# Patient Record
Sex: MALE | Race: WHITE | NOT HISPANIC OR LATINO | Employment: FULL TIME | URBAN - METROPOLITAN AREA
[De-identification: names, ages, dates, MRNs, and addresses within clinical notes are randomized per-mention and may not be internally consistent; named-entity substitution may affect disease eponyms.]

---

## 2020-03-24 ENCOUNTER — TELEPHONE (OUTPATIENT)
Dept: ENDOCRINOLOGY | Age: 45
End: 2020-03-24

## 2020-03-24 NOTE — TELEPHONE ENCOUNTER
RECORDS FOR NEW PT ENDO REFERRAL HAVE BEEN GIVEN TO DR MURRAY FOR REVIEW. WAITING FOR APPROVAL FROM DR MURRAY TO SCHEDULE. PT REFERRED BY DR LALI NARVAEZ FOR DIABETES. NEW PT RECORDS HAVE BEEN SCANNED INTO PT'S CHART.

## 2020-03-26 ENCOUNTER — TELEPHONE (OUTPATIENT)
Dept: ENDOCRINOLOGY | Age: 45
End: 2020-03-26

## 2020-03-26 NOTE — TELEPHONE ENCOUNTER
FIRST ATTEMPT TO REACH PT TO SCHEDULE NEW PT ENDO APPT W/DR MURRAY. LEFT MSG. PER DR MURRAY LEVEL 2 OK TO SCHEDULE.

## 2020-03-27 ENCOUNTER — TELEPHONE (OUTPATIENT)
Dept: ENDOCRINOLOGY | Age: 45
End: 2020-03-27

## 2020-03-30 ENCOUNTER — TELEPHONE (OUTPATIENT)
Dept: ENDOCRINOLOGY | Age: 45
End: 2020-03-30

## 2020-03-30 NOTE — TELEPHONE ENCOUNTER
THIRD ATTEMPT TO REACH PT TO SCHEDULE NEW PT ENDO APPT W/DR MURRAY. LEFT MSG. UNABLE TO REACH PT CONFIRMATION FAXED TO DR NARVAEZ [phone].

## 2020-08-23 ENCOUNTER — APPOINTMENT (OUTPATIENT)
Dept: GENERAL RADIOLOGY | Facility: HOSPITAL | Age: 45
End: 2020-08-23

## 2020-08-23 ENCOUNTER — HOSPITAL ENCOUNTER (INPATIENT)
Facility: HOSPITAL | Age: 45
LOS: 3 days | Discharge: HOME | End: 2020-08-26
Attending: EMERGENCY MEDICINE | Admitting: INTERNAL MEDICINE

## 2020-08-23 DIAGNOSIS — E87.1 HYPONATREMIA: ICD-10-CM

## 2020-08-23 DIAGNOSIS — N28.9 RENAL INSUFFICIENCY: ICD-10-CM

## 2020-08-23 DIAGNOSIS — A41.9 SEPSIS, DUE TO UNSPECIFIED ORGANISM, UNSPECIFIED WHETHER ACUTE ORGAN DYSFUNCTION PRESENT (HCC): ICD-10-CM

## 2020-08-23 DIAGNOSIS — L03.032 CELLULITIS OF GREAT TOE OF LEFT FOOT: Primary | ICD-10-CM

## 2020-08-23 DIAGNOSIS — E87.6 HYPOKALEMIA: ICD-10-CM

## 2020-08-23 LAB
ALBUMIN SERPL-MCNC: 4.1 G/DL (ref 3.5–5.2)
ALBUMIN/GLOB SERPL: 1.2 G/DL
ALP SERPL-CCNC: 65 U/L (ref 39–117)
ALT SERPL W P-5'-P-CCNC: 10 U/L (ref 1–41)
ANION GAP SERPL CALCULATED.3IONS-SCNC: 11.7 MMOL/L (ref 5–15)
AST SERPL-CCNC: 10 U/L (ref 1–40)
B PARAPERT DNA SPEC QL NAA+PROBE: NOT DETECTED
B PERT DNA SPEC QL NAA+PROBE: NOT DETECTED
BASOPHILS # BLD AUTO: 0.06 10*3/MM3 (ref 0–0.2)
BASOPHILS NFR BLD AUTO: 0.8 % (ref 0–1.5)
BILIRUB SERPL-MCNC: 0.3 MG/DL (ref 0–1.2)
BUN SERPL-MCNC: 18 MG/DL (ref 6–20)
BUN/CREAT SERPL: 11.5 (ref 7–25)
C PNEUM DNA NPH QL NAA+NON-PROBE: NOT DETECTED
CALCIUM SPEC-SCNC: 9.7 MG/DL (ref 8.6–10.5)
CHLORIDE SERPL-SCNC: 91 MMOL/L (ref 98–107)
CO2 SERPL-SCNC: 26.3 MMOL/L (ref 22–29)
CREAT SERPL-MCNC: 1.56 MG/DL (ref 0.76–1.27)
CRP SERPL-MCNC: 1.08 MG/DL (ref 0–0.5)
D-LACTATE SERPL-SCNC: 3.1 MMOL/L (ref 0.5–2)
DEPRECATED RDW RBC AUTO: 45.5 FL (ref 37–54)
EOSINOPHIL # BLD AUTO: 0.3 10*3/MM3 (ref 0–0.4)
EOSINOPHIL NFR BLD AUTO: 3.9 % (ref 0.3–6.2)
ERYTHROCYTE [DISTWIDTH] IN BLOOD BY AUTOMATED COUNT: 15 % (ref 12.3–15.4)
ERYTHROCYTE [SEDIMENTATION RATE] IN BLOOD: 58 MM/HR (ref 0–15)
FLUAV H1 2009 PAND RNA NPH QL NAA+PROBE: NOT DETECTED
FLUAV H1 HA GENE NPH QL NAA+PROBE: NOT DETECTED
FLUAV H3 RNA NPH QL NAA+PROBE: NOT DETECTED
FLUAV SUBTYP SPEC NAA+PROBE: NOT DETECTED
FLUBV RNA ISLT QL NAA+PROBE: NOT DETECTED
GFR SERPL CREATININE-BSD FRML MDRD: 48 ML/MIN/1.73
GLOBULIN UR ELPH-MCNC: 3.3 GM/DL
GLUCOSE SERPL-MCNC: 204 MG/DL (ref 65–99)
HADV DNA SPEC NAA+PROBE: NOT DETECTED
HCOV 229E RNA SPEC QL NAA+PROBE: NOT DETECTED
HCOV HKU1 RNA SPEC QL NAA+PROBE: NOT DETECTED
HCOV NL63 RNA SPEC QL NAA+PROBE: NOT DETECTED
HCOV OC43 RNA SPEC QL NAA+PROBE: NOT DETECTED
HCT VFR BLD AUTO: 40.6 % (ref 37.5–51)
HGB BLD-MCNC: 12.9 G/DL (ref 13–17.7)
HMPV RNA NPH QL NAA+NON-PROBE: NOT DETECTED
HPIV1 RNA SPEC QL NAA+PROBE: NOT DETECTED
HPIV2 RNA SPEC QL NAA+PROBE: NOT DETECTED
HPIV3 RNA NPH QL NAA+PROBE: NOT DETECTED
HPIV4 P GENE NPH QL NAA+PROBE: NOT DETECTED
IMM GRANULOCYTES # BLD AUTO: 0.05 10*3/MM3 (ref 0–0.05)
IMM GRANULOCYTES NFR BLD AUTO: 0.6 % (ref 0–0.5)
INR PPP: 1.03 (ref 0.9–1.1)
LYMPHOCYTES # BLD AUTO: 1.3 10*3/MM3 (ref 0.7–3.1)
LYMPHOCYTES NFR BLD AUTO: 16.8 % (ref 19.6–45.3)
M PNEUMO IGG SER IA-ACNC: NOT DETECTED
MCH RBC QN AUTO: 26.7 PG (ref 26.6–33)
MCHC RBC AUTO-ENTMCNC: 31.8 G/DL (ref 31.5–35.7)
MCV RBC AUTO: 84.1 FL (ref 79–97)
MONOCYTES # BLD AUTO: 0.62 10*3/MM3 (ref 0.1–0.9)
MONOCYTES NFR BLD AUTO: 8 % (ref 5–12)
NEUTROPHILS NFR BLD AUTO: 5.43 10*3/MM3 (ref 1.7–7)
NEUTROPHILS NFR BLD AUTO: 69.9 % (ref 42.7–76)
NRBC BLD AUTO-RTO: 0 /100 WBC (ref 0–0.2)
PLATELET # BLD AUTO: 192 10*3/MM3 (ref 140–450)
PMV BLD AUTO: 9.9 FL (ref 6–12)
POTASSIUM SERPL-SCNC: 3 MMOL/L (ref 3.5–5.2)
PROCALCITONIN SERPL-MCNC: 0.1 NG/ML (ref 0–0.25)
PROT SERPL-MCNC: 7.4 G/DL (ref 6–8.5)
PROTHROMBIN TIME: 13.4 SECONDS (ref 11.7–14.2)
RBC # BLD AUTO: 4.83 10*6/MM3 (ref 4.14–5.8)
RESP PATH DNA+RNA PNL NPH NAA+NON-PROBE: NOT DETECTED
RHINOVIRUS RNA SPEC NAA+PROBE: NOT DETECTED
RSV RNA NPH QL NAA+NON-PROBE: NOT DETECTED
SODIUM SERPL-SCNC: 129 MMOL/L (ref 136–145)
WBC # BLD AUTO: 7.76 10*3/MM3 (ref 3.4–10.8)

## 2020-08-23 PROCEDURE — 25010000002 VANCOMYCIN 10 G RECONSTITUTED SOLUTION: Performed by: EMERGENCY MEDICINE

## 2020-08-23 PROCEDURE — 0202U NFCT DS 22 TRGT SARS-COV-2: CPT | Performed by: EMERGENCY MEDICINE

## 2020-08-23 PROCEDURE — 71045 X-RAY EXAM CHEST 1 VIEW: CPT

## 2020-08-23 PROCEDURE — 25010000002 PIPERACILLIN SOD-TAZOBACTAM PER 1 G: Performed by: EMERGENCY MEDICINE

## 2020-08-23 PROCEDURE — 99284 EMERGENCY DEPT VISIT MOD MDM: CPT

## 2020-08-23 PROCEDURE — 36415 COLL VENOUS BLD VENIPUNCTURE: CPT

## 2020-08-23 PROCEDURE — 86140 C-REACTIVE PROTEIN: CPT | Performed by: EMERGENCY MEDICINE

## 2020-08-23 PROCEDURE — 87040 BLOOD CULTURE FOR BACTERIA: CPT | Performed by: EMERGENCY MEDICINE

## 2020-08-23 PROCEDURE — 80053 COMPREHEN METABOLIC PANEL: CPT | Performed by: EMERGENCY MEDICINE

## 2020-08-23 PROCEDURE — 85610 PROTHROMBIN TIME: CPT | Performed by: EMERGENCY MEDICINE

## 2020-08-23 PROCEDURE — 85652 RBC SED RATE AUTOMATED: CPT | Performed by: EMERGENCY MEDICINE

## 2020-08-23 PROCEDURE — 73630 X-RAY EXAM OF FOOT: CPT

## 2020-08-23 PROCEDURE — 81001 URINALYSIS AUTO W/SCOPE: CPT | Performed by: EMERGENCY MEDICINE

## 2020-08-23 PROCEDURE — 84145 PROCALCITONIN (PCT): CPT | Performed by: EMERGENCY MEDICINE

## 2020-08-23 PROCEDURE — 83605 ASSAY OF LACTIC ACID: CPT | Performed by: EMERGENCY MEDICINE

## 2020-08-23 PROCEDURE — 85025 COMPLETE CBC W/AUTO DIFF WBC: CPT | Performed by: EMERGENCY MEDICINE

## 2020-08-23 RX ORDER — ACETAMINOPHEN 500 MG
1000 TABLET ORAL ONCE
Status: COMPLETED | OUTPATIENT
Start: 2020-08-23 | End: 2020-08-23

## 2020-08-23 RX ORDER — POTASSIUM CHLORIDE 750 MG/1
40 CAPSULE, EXTENDED RELEASE ORAL ONCE
Status: COMPLETED | OUTPATIENT
Start: 2020-08-23 | End: 2020-08-23

## 2020-08-23 RX ORDER — BISACODYL 5 MG/1
5 TABLET, DELAYED RELEASE ORAL DAILY PRN
Status: DISCONTINUED | OUTPATIENT
Start: 2020-08-23 | End: 2020-08-26 | Stop reason: HOSPADM

## 2020-08-23 RX ORDER — ALUMINA, MAGNESIA, AND SIMETHICONE 2400; 2400; 240 MG/30ML; MG/30ML; MG/30ML
15 SUSPENSION ORAL EVERY 6 HOURS PRN
Status: DISCONTINUED | OUTPATIENT
Start: 2020-08-23 | End: 2020-08-26 | Stop reason: HOSPADM

## 2020-08-23 RX ORDER — SODIUM CHLORIDE 0.9 % (FLUSH) 0.9 %
10 SYRINGE (ML) INJECTION AS NEEDED
Status: DISCONTINUED | OUTPATIENT
Start: 2020-08-23 | End: 2020-08-26 | Stop reason: HOSPADM

## 2020-08-23 RX ORDER — SODIUM CHLORIDE 9 MG/ML
125 INJECTION, SOLUTION INTRAVENOUS CONTINUOUS
Status: DISCONTINUED | OUTPATIENT
Start: 2020-08-23 | End: 2020-08-26 | Stop reason: HOSPADM

## 2020-08-23 RX ORDER — ONDANSETRON 2 MG/ML
4 INJECTION INTRAMUSCULAR; INTRAVENOUS EVERY 6 HOURS PRN
Status: DISCONTINUED | OUTPATIENT
Start: 2020-08-23 | End: 2020-08-26 | Stop reason: HOSPADM

## 2020-08-23 RX ORDER — ONDANSETRON 4 MG/1
4 TABLET, FILM COATED ORAL EVERY 6 HOURS PRN
Status: DISCONTINUED | OUTPATIENT
Start: 2020-08-23 | End: 2020-08-26 | Stop reason: HOSPADM

## 2020-08-23 RX ORDER — NITROGLYCERIN 0.4 MG/1
0.4 TABLET SUBLINGUAL
Status: DISCONTINUED | OUTPATIENT
Start: 2020-08-23 | End: 2020-08-26 | Stop reason: HOSPADM

## 2020-08-23 RX ORDER — ACETAMINOPHEN 325 MG/1
650 TABLET ORAL EVERY 4 HOURS PRN
Status: DISCONTINUED | OUTPATIENT
Start: 2020-08-23 | End: 2020-08-26 | Stop reason: HOSPADM

## 2020-08-23 RX ORDER — BISACODYL 10 MG
10 SUPPOSITORY, RECTAL RECTAL DAILY PRN
Status: DISCONTINUED | OUTPATIENT
Start: 2020-08-23 | End: 2020-08-26 | Stop reason: HOSPADM

## 2020-08-23 RX ADMIN — SODIUM CHLORIDE 2535 ML: 9 INJECTION, SOLUTION INTRAVENOUS at 23:36

## 2020-08-23 RX ADMIN — VANCOMYCIN HYDROCHLORIDE 1750 MG: 10 INJECTION, POWDER, LYOPHILIZED, FOR SOLUTION INTRAVENOUS at 23:11

## 2020-08-23 RX ADMIN — SODIUM CHLORIDE 1000 ML: 9 INJECTION, SOLUTION INTRAVENOUS at 22:05

## 2020-08-23 RX ADMIN — SODIUM CHLORIDE 125 ML/HR: 9 INJECTION, SOLUTION INTRAVENOUS at 22:05

## 2020-08-23 RX ADMIN — ACETAMINOPHEN 1000 MG: 500 TABLET ORAL at 23:36

## 2020-08-23 RX ADMIN — POTASSIUM CHLORIDE 40 MEQ: 10 CAPSULE, COATED, EXTENDED RELEASE ORAL at 23:36

## 2020-08-23 RX ADMIN — TAZOBACTAM SODIUM AND PIPERACILLIN SODIUM 3.38 G: 375; 3 INJECTION, SOLUTION INTRAVENOUS at 22:05

## 2020-08-24 ENCOUNTER — APPOINTMENT (OUTPATIENT)
Dept: MRI IMAGING | Facility: HOSPITAL | Age: 45
End: 2020-08-24

## 2020-08-24 PROBLEM — E11.65 TYPE 2 DIABETES MELLITUS WITH HYPERGLYCEMIA, WITHOUT LONG-TERM CURRENT USE OF INSULIN: Status: ACTIVE | Noted: 2020-08-24

## 2020-08-24 PROBLEM — E87.6 HYPOKALEMIA: Status: ACTIVE | Noted: 2020-08-24

## 2020-08-24 PROBLEM — E11.40 DIABETIC NEUROPATHY ASSOCIATED WITH TYPE 2 DIABETES MELLITUS: Status: ACTIVE | Noted: 2020-08-24

## 2020-08-24 PROBLEM — I10 ESSENTIAL HYPERTENSION: Status: ACTIVE | Noted: 2020-08-24

## 2020-08-24 PROBLEM — E87.1 HYPONATREMIA: Status: ACTIVE | Noted: 2020-08-24

## 2020-08-24 PROBLEM — E66.01 MORBID OBESITY WITH BMI OF 45.0-49.9, ADULT: Status: ACTIVE | Noted: 2020-08-24

## 2020-08-24 PROBLEM — A41.9 SEPSIS WITHOUT ACUTE ORGAN DYSFUNCTION: Status: ACTIVE | Noted: 2020-08-24

## 2020-08-24 LAB
ANION GAP SERPL CALCULATED.3IONS-SCNC: 10.4 MMOL/L (ref 5–15)
BACTERIA UR QL AUTO: NORMAL /HPF
BASOPHILS # BLD AUTO: 0.04 10*3/MM3 (ref 0–0.2)
BASOPHILS NFR BLD AUTO: 0.6 % (ref 0–1.5)
BILIRUB UR QL STRIP: NEGATIVE
BUN SERPL-MCNC: 17 MG/DL (ref 6–20)
BUN/CREAT SERPL: 11.2 (ref 7–25)
CALCIUM SPEC-SCNC: 8.7 MG/DL (ref 8.6–10.5)
CHLORIDE SERPL-SCNC: 96 MMOL/L (ref 98–107)
CLARITY UR: CLEAR
CO2 SERPL-SCNC: 24.6 MMOL/L (ref 22–29)
COLOR UR: YELLOW
CREAT SERPL-MCNC: 1.52 MG/DL (ref 0.76–1.27)
D-LACTATE SERPL-SCNC: 1.1 MMOL/L (ref 0.5–2)
DEPRECATED RDW RBC AUTO: 43.2 FL (ref 37–54)
EOSINOPHIL # BLD AUTO: 0.26 10*3/MM3 (ref 0–0.4)
EOSINOPHIL NFR BLD AUTO: 3.9 % (ref 0.3–6.2)
ERYTHROCYTE [DISTWIDTH] IN BLOOD BY AUTOMATED COUNT: 14.9 % (ref 12.3–15.4)
GFR SERPL CREATININE-BSD FRML MDRD: 50 ML/MIN/1.73
GLUCOSE BLDC GLUCOMTR-MCNC: 157 MG/DL (ref 70–130)
GLUCOSE BLDC GLUCOMTR-MCNC: 195 MG/DL (ref 70–130)
GLUCOSE BLDC GLUCOMTR-MCNC: 197 MG/DL (ref 70–130)
GLUCOSE BLDC GLUCOMTR-MCNC: 202 MG/DL (ref 70–130)
GLUCOSE SERPL-MCNC: 149 MG/DL (ref 65–99)
GLUCOSE UR STRIP-MCNC: NEGATIVE MG/DL
HBA1C MFR BLD: 9.9 % (ref 4.8–5.6)
HCT VFR BLD AUTO: 37.7 % (ref 37.5–51)
HGB BLD-MCNC: 12.7 G/DL (ref 13–17.7)
HGB UR QL STRIP.AUTO: NEGATIVE
HYALINE CASTS UR QL AUTO: NORMAL /LPF
IMM GRANULOCYTES # BLD AUTO: 0.05 10*3/MM3 (ref 0–0.05)
IMM GRANULOCYTES NFR BLD AUTO: 0.8 % (ref 0–0.5)
KETONES UR QL STRIP: NEGATIVE
LACTATE HOLD SPECIMEN: NORMAL
LEUKOCYTE ESTERASE UR QL STRIP.AUTO: NEGATIVE
LYMPHOCYTES # BLD AUTO: 1.32 10*3/MM3 (ref 0.7–3.1)
LYMPHOCYTES NFR BLD AUTO: 19.9 % (ref 19.6–45.3)
MCH RBC QN AUTO: 27.3 PG (ref 26.6–33)
MCHC RBC AUTO-ENTMCNC: 33.7 G/DL (ref 31.5–35.7)
MCV RBC AUTO: 80.9 FL (ref 79–97)
MONOCYTES # BLD AUTO: 0.49 10*3/MM3 (ref 0.1–0.9)
MONOCYTES NFR BLD AUTO: 7.4 % (ref 5–12)
NEUTROPHILS NFR BLD AUTO: 4.46 10*3/MM3 (ref 1.7–7)
NEUTROPHILS NFR BLD AUTO: 67.4 % (ref 42.7–76)
NITRITE UR QL STRIP: NEGATIVE
NRBC BLD AUTO-RTO: 0 /100 WBC (ref 0–0.2)
PH UR STRIP.AUTO: <=5 [PH] (ref 5–8)
PLATELET # BLD AUTO: 174 10*3/MM3 (ref 140–450)
PMV BLD AUTO: 9.9 FL (ref 6–12)
POTASSIUM SERPL-SCNC: 2.8 MMOL/L (ref 3.5–5.2)
POTASSIUM SERPL-SCNC: 3.3 MMOL/L (ref 3.5–5.2)
PROT UR QL STRIP: ABNORMAL
RBC # BLD AUTO: 4.66 10*6/MM3 (ref 4.14–5.8)
RBC # UR: NORMAL /HPF
REF LAB TEST METHOD: NORMAL
SODIUM SERPL-SCNC: 131 MMOL/L (ref 136–145)
SP GR UR STRIP: 1.03 (ref 1–1.03)
SQUAMOUS #/AREA URNS HPF: NORMAL /HPF
UROBILINOGEN UR QL STRIP: ABNORMAL
WBC # BLD AUTO: 6.62 10*3/MM3 (ref 3.4–10.8)
WBC UR QL AUTO: NORMAL /HPF

## 2020-08-24 PROCEDURE — 84132 ASSAY OF SERUM POTASSIUM: CPT | Performed by: HOSPITALIST

## 2020-08-24 PROCEDURE — 25010000002 ENOXAPARIN PER 10 MG: Performed by: NURSE PRACTITIONER

## 2020-08-24 PROCEDURE — 82962 GLUCOSE BLOOD TEST: CPT

## 2020-08-24 PROCEDURE — 36415 COLL VENOUS BLD VENIPUNCTURE: CPT | Performed by: NURSE PRACTITIONER

## 2020-08-24 PROCEDURE — 80048 BASIC METABOLIC PNL TOTAL CA: CPT | Performed by: NURSE PRACTITIONER

## 2020-08-24 PROCEDURE — 85025 COMPLETE CBC W/AUTO DIFF WBC: CPT | Performed by: NURSE PRACTITIONER

## 2020-08-24 PROCEDURE — 83605 ASSAY OF LACTIC ACID: CPT | Performed by: EMERGENCY MEDICINE

## 2020-08-24 PROCEDURE — 73720 MRI LWR EXTREMITY W/O&W/DYE: CPT

## 2020-08-24 PROCEDURE — 25010000002 GADOTERATE MEGLUMINE 10 MMOL/20ML SOLUTION: Performed by: HOSPITALIST

## 2020-08-24 PROCEDURE — 25010000002 VANCOMYCIN PER 500 MG: Performed by: INTERNAL MEDICINE

## 2020-08-24 PROCEDURE — 63710000001 INSULIN LISPRO (HUMAN) PER 5 UNITS: Performed by: NURSE PRACTITIONER

## 2020-08-24 PROCEDURE — A9575 INJ GADOTERATE MEGLUMI 0.1ML: HCPCS | Performed by: HOSPITALIST

## 2020-08-24 PROCEDURE — 25010000003 CEFTRIAXONE PER 250 MG: Performed by: INTERNAL MEDICINE

## 2020-08-24 PROCEDURE — 99254 IP/OBS CNSLTJ NEW/EST MOD 60: CPT | Performed by: INTERNAL MEDICINE

## 2020-08-24 PROCEDURE — 83036 HEMOGLOBIN GLYCOSYLATED A1C: CPT | Performed by: HOSPITALIST

## 2020-08-24 RX ORDER — METOPROLOL TARTRATE 50 MG/1
50 TABLET, FILM COATED ORAL 2 TIMES DAILY
COMMUNITY

## 2020-08-24 RX ORDER — CLONIDINE HYDROCHLORIDE 0.3 MG/1
0.3 TABLET ORAL 2 TIMES DAILY
COMMUNITY
End: 2022-04-13 | Stop reason: SDUPTHER

## 2020-08-24 RX ORDER — SIMVASTATIN 20 MG
20 TABLET ORAL NIGHTLY
COMMUNITY
End: 2022-03-08 | Stop reason: SDUPTHER

## 2020-08-24 RX ORDER — HYDROCHLOROTHIAZIDE 25 MG/1
25 TABLET ORAL 2 TIMES DAILY
COMMUNITY
End: 2020-08-26 | Stop reason: HOSPADM

## 2020-08-24 RX ORDER — GADOTERATE MEGLUMINE 376.9 MG/ML
20 INJECTION INTRAVENOUS
Status: COMPLETED | OUTPATIENT
Start: 2020-08-24 | End: 2020-08-24

## 2020-08-24 RX ORDER — CEFTRIAXONE SODIUM 2 G/50ML
2 INJECTION, SOLUTION INTRAVENOUS EVERY 24 HOURS
Status: DISCONTINUED | OUTPATIENT
Start: 2020-08-24 | End: 2020-08-26 | Stop reason: HOSPADM

## 2020-08-24 RX ORDER — ATORVASTATIN CALCIUM 20 MG/1
10 TABLET, FILM COATED ORAL DAILY
Status: DISCONTINUED | OUTPATIENT
Start: 2020-08-24 | End: 2020-08-26 | Stop reason: HOSPADM

## 2020-08-24 RX ORDER — ESCITALOPRAM OXALATE 20 MG/1
20 TABLET ORAL DAILY
COMMUNITY

## 2020-08-24 RX ORDER — NICOTINE POLACRILEX 4 MG
15 LOZENGE BUCCAL
Status: DISCONTINUED | OUTPATIENT
Start: 2020-08-24 | End: 2020-08-26 | Stop reason: HOSPADM

## 2020-08-24 RX ORDER — ESCITALOPRAM OXALATE 20 MG/1
20 TABLET ORAL DAILY
Status: DISCONTINUED | OUTPATIENT
Start: 2020-08-24 | End: 2020-08-26 | Stop reason: HOSPADM

## 2020-08-24 RX ORDER — AMLODIPINE BESYLATE 10 MG/1
10 TABLET ORAL DAILY
COMMUNITY
End: 2022-03-08 | Stop reason: SDUPTHER

## 2020-08-24 RX ORDER — ASPIRIN 81 MG/1
81 TABLET ORAL DAILY
COMMUNITY
End: 2020-08-26 | Stop reason: HOSPADM

## 2020-08-24 RX ORDER — ASPIRIN 81 MG/1
81 TABLET, CHEWABLE ORAL DAILY
COMMUNITY

## 2020-08-24 RX ORDER — DEXTROSE MONOHYDRATE 25 G/50ML
25 INJECTION, SOLUTION INTRAVENOUS
Status: DISCONTINUED | OUTPATIENT
Start: 2020-08-24 | End: 2020-08-26 | Stop reason: HOSPADM

## 2020-08-24 RX ORDER — GLYBURIDE 5 MG/1
10 TABLET ORAL 2 TIMES DAILY WITH MEALS
COMMUNITY
End: 2020-11-03

## 2020-08-24 RX ORDER — ASPIRIN 81 MG/1
81 TABLET, CHEWABLE ORAL DAILY
Status: DISCONTINUED | OUTPATIENT
Start: 2020-08-24 | End: 2020-08-26 | Stop reason: HOSPADM

## 2020-08-24 RX ORDER — METOPROLOL TARTRATE 50 MG/1
50 TABLET, FILM COATED ORAL 2 TIMES DAILY
Status: DISCONTINUED | OUTPATIENT
Start: 2020-08-24 | End: 2020-08-26 | Stop reason: HOSPADM

## 2020-08-24 RX ORDER — CLONIDINE HYDROCHLORIDE 0.1 MG/1
0.3 TABLET ORAL 2 TIMES DAILY
Status: DISCONTINUED | OUTPATIENT
Start: 2020-08-24 | End: 2020-08-26 | Stop reason: HOSPADM

## 2020-08-24 RX ORDER — VANCOMYCIN HYDROCHLORIDE 1 G/200ML
1000 INJECTION, SOLUTION INTRAVENOUS EVERY 12 HOURS
Status: DISCONTINUED | OUTPATIENT
Start: 2020-08-24 | End: 2020-08-26

## 2020-08-24 RX ORDER — POTASSIUM CHLORIDE 1.5 G/1.77G
40 POWDER, FOR SOLUTION ORAL AS NEEDED
Status: DISCONTINUED | OUTPATIENT
Start: 2020-08-24 | End: 2020-08-26 | Stop reason: HOSPADM

## 2020-08-24 RX ORDER — POTASSIUM CHLORIDE 750 MG/1
40 CAPSULE, EXTENDED RELEASE ORAL AS NEEDED
Status: DISCONTINUED | OUTPATIENT
Start: 2020-08-24 | End: 2020-08-26 | Stop reason: HOSPADM

## 2020-08-24 RX ORDER — BENAZEPRIL HYDROCHLORIDE 20 MG/1
40 TABLET ORAL 2 TIMES DAILY
COMMUNITY
End: 2022-03-08 | Stop reason: SDUPTHER

## 2020-08-24 RX ORDER — AMLODIPINE BESYLATE 10 MG/1
10 TABLET ORAL DAILY
Status: DISCONTINUED | OUTPATIENT
Start: 2020-08-24 | End: 2020-08-26 | Stop reason: HOSPADM

## 2020-08-24 RX ADMIN — ASPIRIN 81 MG: 81 TABLET, CHEWABLE ORAL at 08:04

## 2020-08-24 RX ADMIN — VANCOMYCIN HYDROCHLORIDE 1000 MG: 1 INJECTION, SOLUTION INTRAVENOUS at 14:04

## 2020-08-24 RX ADMIN — ENOXAPARIN SODIUM 40 MG: 40 INJECTION SUBCUTANEOUS at 00:33

## 2020-08-24 RX ADMIN — INSULIN LISPRO 2 UNITS: 100 INJECTION, SOLUTION INTRAVENOUS; SUBCUTANEOUS at 08:05

## 2020-08-24 RX ADMIN — POTASSIUM CHLORIDE 40 MEQ: 10 CAPSULE, COATED, EXTENDED RELEASE ORAL at 22:04

## 2020-08-24 RX ADMIN — SODIUM CHLORIDE 125 ML/HR: 9 INJECTION, SOLUTION INTRAVENOUS at 21:58

## 2020-08-24 RX ADMIN — VANCOMYCIN HYDROCHLORIDE 1000 MG: 1 INJECTION, SOLUTION INTRAVENOUS at 22:00

## 2020-08-24 RX ADMIN — ACETAMINOPHEN 650 MG: 325 TABLET, FILM COATED ORAL at 16:05

## 2020-08-24 RX ADMIN — ENOXAPARIN SODIUM 40 MG: 40 INJECTION SUBCUTANEOUS at 08:05

## 2020-08-24 RX ADMIN — CEFTRIAXONE SODIUM 2 G: 2 INJECTION, SOLUTION INTRAVENOUS at 11:03

## 2020-08-24 RX ADMIN — ENOXAPARIN SODIUM 40 MG: 40 INJECTION SUBCUTANEOUS at 21:57

## 2020-08-24 RX ADMIN — CLONIDINE HYDROCHLORIDE 0.3 MG: 0.1 TABLET ORAL at 21:57

## 2020-08-24 RX ADMIN — SODIUM CHLORIDE 125 ML/HR: 9 INJECTION, SOLUTION INTRAVENOUS at 11:03

## 2020-08-24 RX ADMIN — METOPROLOL TARTRATE 50 MG: 50 TABLET, FILM COATED ORAL at 08:04

## 2020-08-24 RX ADMIN — GADOTERATE MEGLUMINE 20 ML: 376.9 INJECTION, SOLUTION INTRAVENOUS at 13:15

## 2020-08-24 RX ADMIN — AMLODIPINE BESYLATE 10 MG: 10 TABLET ORAL at 08:04

## 2020-08-24 RX ADMIN — INSULIN LISPRO 2 UNITS: 100 INJECTION, SOLUTION INTRAVENOUS; SUBCUTANEOUS at 17:24

## 2020-08-24 RX ADMIN — INSULIN LISPRO 2 UNITS: 100 INJECTION, SOLUTION INTRAVENOUS; SUBCUTANEOUS at 14:04

## 2020-08-24 RX ADMIN — SODIUM CHLORIDE, PRESERVATIVE FREE 10 ML: 5 INJECTION INTRAVENOUS at 08:05

## 2020-08-24 RX ADMIN — ESCITALOPRAM 20 MG: 20 TABLET, FILM COATED ORAL at 08:05

## 2020-08-24 RX ADMIN — ACETAMINOPHEN 650 MG: 325 TABLET, FILM COATED ORAL at 21:57

## 2020-08-24 RX ADMIN — POTASSIUM CHLORIDE 40 MEQ: 10 CAPSULE, COATED, EXTENDED RELEASE ORAL at 05:08

## 2020-08-24 RX ADMIN — CLONIDINE HYDROCHLORIDE 0.3 MG: 0.1 TABLET ORAL at 08:04

## 2020-08-24 RX ADMIN — POTASSIUM CHLORIDE 40 MEQ: 10 CAPSULE, COATED, EXTENDED RELEASE ORAL at 09:11

## 2020-08-24 RX ADMIN — ATORVASTATIN CALCIUM 10 MG: 20 TABLET, FILM COATED ORAL at 08:04

## 2020-08-24 RX ADMIN — METOPROLOL TARTRATE 50 MG: 50 TABLET, FILM COATED ORAL at 21:56

## 2020-08-25 LAB
CREAT SERPL-MCNC: 1.18 MG/DL (ref 0.76–1.27)
GFR SERPL CREATININE-BSD FRML MDRD: 66 ML/MIN/1.73
GLUCOSE BLDC GLUCOMTR-MCNC: 190 MG/DL (ref 70–130)
GLUCOSE BLDC GLUCOMTR-MCNC: 244 MG/DL (ref 70–130)
GLUCOSE BLDC GLUCOMTR-MCNC: 259 MG/DL (ref 70–130)
GLUCOSE BLDC GLUCOMTR-MCNC: 269 MG/DL (ref 70–130)
POTASSIUM SERPL-SCNC: 3.8 MMOL/L (ref 3.5–5.2)

## 2020-08-25 PROCEDURE — 99232 SBSQ HOSP IP/OBS MODERATE 35: CPT | Performed by: INTERNAL MEDICINE

## 2020-08-25 PROCEDURE — 82962 GLUCOSE BLOOD TEST: CPT

## 2020-08-25 PROCEDURE — 82565 ASSAY OF CREATININE: CPT | Performed by: INTERNAL MEDICINE

## 2020-08-25 PROCEDURE — 63710000001 INSULIN LISPRO (HUMAN) PER 5 UNITS: Performed by: NURSE PRACTITIONER

## 2020-08-25 PROCEDURE — 25010000002 VANCOMYCIN PER 500 MG: Performed by: INTERNAL MEDICINE

## 2020-08-25 PROCEDURE — 84132 ASSAY OF SERUM POTASSIUM: CPT | Performed by: HOSPITALIST

## 2020-08-25 PROCEDURE — 25010000002 ENOXAPARIN PER 10 MG: Performed by: NURSE PRACTITIONER

## 2020-08-25 PROCEDURE — 25010000003 CEFTRIAXONE PER 250 MG: Performed by: INTERNAL MEDICINE

## 2020-08-25 RX ADMIN — ENOXAPARIN SODIUM 40 MG: 40 INJECTION SUBCUTANEOUS at 21:02

## 2020-08-25 RX ADMIN — INSULIN LISPRO 4 UNITS: 100 INJECTION, SOLUTION INTRAVENOUS; SUBCUTANEOUS at 12:31

## 2020-08-25 RX ADMIN — CLONIDINE HYDROCHLORIDE 0.3 MG: 0.1 TABLET ORAL at 08:45

## 2020-08-25 RX ADMIN — ACETAMINOPHEN 650 MG: 325 TABLET, FILM COATED ORAL at 17:13

## 2020-08-25 RX ADMIN — ACETAMINOPHEN 650 MG: 325 TABLET, FILM COATED ORAL at 06:18

## 2020-08-25 RX ADMIN — CEFTRIAXONE SODIUM 2 G: 2 INJECTION, SOLUTION INTRAVENOUS at 10:22

## 2020-08-25 RX ADMIN — ENOXAPARIN SODIUM 40 MG: 40 INJECTION SUBCUTANEOUS at 08:45

## 2020-08-25 RX ADMIN — METOPROLOL TARTRATE 50 MG: 50 TABLET, FILM COATED ORAL at 21:02

## 2020-08-25 RX ADMIN — SODIUM CHLORIDE 125 ML/HR: 9 INJECTION, SOLUTION INTRAVENOUS at 14:26

## 2020-08-25 RX ADMIN — ATORVASTATIN CALCIUM 10 MG: 20 TABLET, FILM COATED ORAL at 08:45

## 2020-08-25 RX ADMIN — SODIUM CHLORIDE 125 ML/HR: 9 INJECTION, SOLUTION INTRAVENOUS at 22:21

## 2020-08-25 RX ADMIN — METOPROLOL TARTRATE 50 MG: 50 TABLET, FILM COATED ORAL at 08:45

## 2020-08-25 RX ADMIN — CLONIDINE HYDROCHLORIDE 0.3 MG: 0.1 TABLET ORAL at 21:02

## 2020-08-25 RX ADMIN — INSULIN LISPRO 2 UNITS: 100 INJECTION, SOLUTION INTRAVENOUS; SUBCUTANEOUS at 08:45

## 2020-08-25 RX ADMIN — VANCOMYCIN HYDROCHLORIDE 1000 MG: 1 INJECTION, SOLUTION INTRAVENOUS at 13:08

## 2020-08-25 RX ADMIN — AMLODIPINE BESYLATE 10 MG: 10 TABLET ORAL at 08:45

## 2020-08-25 RX ADMIN — SODIUM CHLORIDE 125 ML/HR: 9 INJECTION, SOLUTION INTRAVENOUS at 06:18

## 2020-08-25 RX ADMIN — ACETAMINOPHEN 650 MG: 325 TABLET, FILM COATED ORAL at 02:07

## 2020-08-25 RX ADMIN — POTASSIUM CHLORIDE 40 MEQ: 10 CAPSULE, COATED, EXTENDED RELEASE ORAL at 02:07

## 2020-08-25 RX ADMIN — ASPIRIN 81 MG: 81 TABLET, CHEWABLE ORAL at 08:45

## 2020-08-25 RX ADMIN — VANCOMYCIN HYDROCHLORIDE 1000 MG: 1 INJECTION, SOLUTION INTRAVENOUS at 22:57

## 2020-08-25 RX ADMIN — ACETAMINOPHEN 650 MG: 325 TABLET, FILM COATED ORAL at 21:02

## 2020-08-25 RX ADMIN — INSULIN LISPRO 6 UNITS: 100 INJECTION, SOLUTION INTRAVENOUS; SUBCUTANEOUS at 17:13

## 2020-08-25 RX ADMIN — ESCITALOPRAM 20 MG: 20 TABLET, FILM COATED ORAL at 08:45

## 2020-08-26 VITALS
HEIGHT: 75 IN | SYSTOLIC BLOOD PRESSURE: 154 MMHG | WEIGHT: 315 LBS | DIASTOLIC BLOOD PRESSURE: 84 MMHG | HEART RATE: 57 BPM | RESPIRATION RATE: 18 BRPM | TEMPERATURE: 98.7 F | BODY MASS INDEX: 39.17 KG/M2 | OXYGEN SATURATION: 94 %

## 2020-08-26 LAB
ANION GAP SERPL CALCULATED.3IONS-SCNC: 8.3 MMOL/L (ref 5–15)
BUN SERPL-MCNC: 9 MG/DL (ref 6–20)
BUN/CREAT SERPL: 9.7 (ref 7–25)
CALCIUM SPEC-SCNC: 8.5 MG/DL (ref 8.6–10.5)
CHLORIDE SERPL-SCNC: 106 MMOL/L (ref 98–107)
CO2 SERPL-SCNC: 23.7 MMOL/L (ref 22–29)
CREAT SERPL-MCNC: 0.93 MG/DL (ref 0.76–1.27)
DEPRECATED RDW RBC AUTO: 44.8 FL (ref 37–54)
ERYTHROCYTE [DISTWIDTH] IN BLOOD BY AUTOMATED COUNT: 14.7 % (ref 12.3–15.4)
GFR SERPL CREATININE-BSD FRML MDRD: 87 ML/MIN/1.73
GLUCOSE BLDC GLUCOMTR-MCNC: 205 MG/DL (ref 70–130)
GLUCOSE BLDC GLUCOMTR-MCNC: 233 MG/DL (ref 70–130)
GLUCOSE SERPL-MCNC: 214 MG/DL (ref 65–99)
HCT VFR BLD AUTO: 35.4 % (ref 37.5–51)
HGB BLD-MCNC: 11.6 G/DL (ref 13–17.7)
MAGNESIUM SERPL-MCNC: 1.9 MG/DL (ref 1.6–2.6)
MCH RBC QN AUTO: 27.5 PG (ref 26.6–33)
MCHC RBC AUTO-ENTMCNC: 32.8 G/DL (ref 31.5–35.7)
MCV RBC AUTO: 83.9 FL (ref 79–97)
PLATELET # BLD AUTO: 140 10*3/MM3 (ref 140–450)
PMV BLD AUTO: 10.4 FL (ref 6–12)
POTASSIUM SERPL-SCNC: 3.6 MMOL/L (ref 3.5–5.2)
RBC # BLD AUTO: 4.22 10*6/MM3 (ref 4.14–5.8)
SODIUM SERPL-SCNC: 138 MMOL/L (ref 136–145)
VANCOMYCIN TROUGH SERPL-MCNC: 6.3 MCG/ML (ref 5–20)
WBC # BLD AUTO: 4.83 10*3/MM3 (ref 3.4–10.8)

## 2020-08-26 PROCEDURE — 85027 COMPLETE CBC AUTOMATED: CPT | Performed by: INTERNAL MEDICINE

## 2020-08-26 PROCEDURE — 63710000001 INSULIN LISPRO (HUMAN) PER 5 UNITS: Performed by: NURSE PRACTITIONER

## 2020-08-26 PROCEDURE — 25010000002 VANCOMYCIN PER 500 MG: Performed by: INTERNAL MEDICINE

## 2020-08-26 PROCEDURE — 82962 GLUCOSE BLOOD TEST: CPT

## 2020-08-26 PROCEDURE — 83735 ASSAY OF MAGNESIUM: CPT | Performed by: INTERNAL MEDICINE

## 2020-08-26 PROCEDURE — 99232 SBSQ HOSP IP/OBS MODERATE 35: CPT | Performed by: INTERNAL MEDICINE

## 2020-08-26 PROCEDURE — 80048 BASIC METABOLIC PNL TOTAL CA: CPT | Performed by: INTERNAL MEDICINE

## 2020-08-26 PROCEDURE — 25010000003 CEFTRIAXONE PER 250 MG: Performed by: INTERNAL MEDICINE

## 2020-08-26 PROCEDURE — 25010000002 ENOXAPARIN PER 10 MG: Performed by: NURSE PRACTITIONER

## 2020-08-26 PROCEDURE — 80202 ASSAY OF VANCOMYCIN: CPT | Performed by: INTERNAL MEDICINE

## 2020-08-26 RX ORDER — LEVOFLOXACIN 750 MG/1
750 TABLET ORAL DAILY
Qty: 6 TABLET | Refills: 0 | Status: SHIPPED | OUTPATIENT
Start: 2020-08-26 | End: 2020-09-01

## 2020-08-26 RX ORDER — SULFAMETHOXAZOLE AND TRIMETHOPRIM 800; 160 MG/1; MG/1
1 TABLET ORAL 2 TIMES DAILY
Qty: 12 TABLET | Refills: 0 | Status: SHIPPED | OUTPATIENT
Start: 2020-08-26 | End: 2020-09-01

## 2020-08-26 RX ADMIN — METOPROLOL TARTRATE 50 MG: 50 TABLET, FILM COATED ORAL at 08:16

## 2020-08-26 RX ADMIN — ATORVASTATIN CALCIUM 10 MG: 20 TABLET, FILM COATED ORAL at 08:16

## 2020-08-26 RX ADMIN — ENOXAPARIN SODIUM 40 MG: 40 INJECTION SUBCUTANEOUS at 08:16

## 2020-08-26 RX ADMIN — INSULIN LISPRO 4 UNITS: 100 INJECTION, SOLUTION INTRAVENOUS; SUBCUTANEOUS at 08:15

## 2020-08-26 RX ADMIN — VANCOMYCIN HYDROCHLORIDE 1000 MG: 1 INJECTION, SOLUTION INTRAVENOUS at 12:03

## 2020-08-26 RX ADMIN — ASPIRIN 81 MG: 81 TABLET, CHEWABLE ORAL at 08:16

## 2020-08-26 RX ADMIN — ESCITALOPRAM 20 MG: 20 TABLET, FILM COATED ORAL at 08:16

## 2020-08-26 RX ADMIN — CLONIDINE HYDROCHLORIDE 0.3 MG: 0.1 TABLET ORAL at 08:16

## 2020-08-26 RX ADMIN — INSULIN LISPRO 4 UNITS: 100 INJECTION, SOLUTION INTRAVENOUS; SUBCUTANEOUS at 13:06

## 2020-08-26 RX ADMIN — AMLODIPINE BESYLATE 10 MG: 10 TABLET ORAL at 08:16

## 2020-08-26 RX ADMIN — CEFTRIAXONE SODIUM 2 G: 2 INJECTION, SOLUTION INTRAVENOUS at 11:21

## 2020-08-27 ENCOUNTER — READMISSION MANAGEMENT (OUTPATIENT)
Dept: CALL CENTER | Facility: HOSPITAL | Age: 45
End: 2020-08-27

## 2020-08-27 NOTE — OUTREACH NOTE
Prep Survey      Responses   [facility] patient discharged from?  [place]   Is LACE score < 7 ?  No   Eligibility  Readm Mgmt   Discharge diagnosis  Cellulitis of great toe of left foot   COVID-19 Test Status  Negative   Does the patient have one of the following disease processes/diagnoses(primary or secondary)?  Other   Does the patient have Home health ordered?  No   Is there a DME ordered?  No   Prep survey completed?  Yes          Xenia Infante RN

## 2020-08-28 LAB
BACTERIA SPEC AEROBE CULT: NORMAL
BACTERIA SPEC AEROBE CULT: NORMAL

## 2020-09-02 ENCOUNTER — READMISSION MANAGEMENT (OUTPATIENT)
Dept: CALL CENTER | Facility: HOSPITAL | Age: 45
End: 2020-09-02

## 2020-09-02 NOTE — OUTREACH NOTE
Medical Week 1 Survey      Responses   [facility] patient discharged from?  [place]   COVID-19 Test Status  Negative   Does the patient have one of the following disease processes/diagnoses(primary or secondary)?  Other   Is there a successful TCM telephone encounter documented?  No   Week 1 attempt successful?  No   Unsuccessful attempts  Attempt 1          Pinky Gonzalez RN

## 2020-09-08 ENCOUNTER — READMISSION MANAGEMENT (OUTPATIENT)
Dept: CALL CENTER | Facility: HOSPITAL | Age: 45
End: 2020-09-08

## 2020-09-08 NOTE — OUTREACH NOTE
Medical Week 1 Survey      Responses   [facility] patient discharged from?  [place]   COVID-19 Test Status  Negative   Does the patient have one of the following disease processes/diagnoses(primary or secondary)?  Other   Is there a successful TCM telephone encounter documented?  No   Week 1 attempt successful?  No   Unsuccessful attempts  Attempt 2          Jennifer Sloan RN

## 2020-09-10 ENCOUNTER — READMISSION MANAGEMENT (OUTPATIENT)
Dept: CALL CENTER | Facility: HOSPITAL | Age: 45
End: 2020-09-10

## 2020-09-10 NOTE — OUTREACH NOTE
Medical Week 2 Survey      Responses   [facility] patient discharged from?  [place]   COVID-19 Test Status  Negative   Does the patient have one of the following disease processes/diagnoses(primary or secondary)?  Other   Week 2 attempt successful?  No   Unsuccessful attempts  Attempt 1          Leigh Viera RN

## 2020-09-15 ENCOUNTER — READMISSION MANAGEMENT (OUTPATIENT)
Dept: CALL CENTER | Facility: HOSPITAL | Age: 45
End: 2020-09-15

## 2020-09-15 NOTE — OUTREACH NOTE
Medical Week 2 Survey      Responses   [facility] patient discharged from?  [place]   COVID-19 Test Status  Negative   Does the patient have one of the following disease processes/diagnoses(primary or secondary)?  Other   Week 2 attempt successful?  No   Unsuccessful attempts  Attempt 2          Amy Mccracken RN

## 2020-09-23 ENCOUNTER — READMISSION MANAGEMENT (OUTPATIENT)
Dept: CALL CENTER | Facility: HOSPITAL | Age: 45
End: 2020-09-23

## 2020-09-23 NOTE — OUTREACH NOTE
Medical Week 3 Survey      Responses   [facility] patient discharged from?  [place]   COVID-19 Test Status  Negative   Does the patient have one of the following disease processes/diagnoses(primary or secondary)?  Other   Week 3 attempt successful?  No   Unsuccessful attempts  Attempt 1          Yaquelin Barahona RN

## 2020-09-28 ENCOUNTER — READMISSION MANAGEMENT (OUTPATIENT)
Dept: CALL CENTER | Facility: HOSPITAL | Age: 45
End: 2020-09-28

## 2020-09-28 NOTE — OUTREACH NOTE
Medical Week 3 Survey      Responses   [facility] patient discharged from?  [place]   Does the patient have one of the following disease processes/diagnoses(primary or secondary)?  Other   Week 3 attempt successful?  No   Unsuccessful attempts  Attempt 2          Gissel Duron RN

## 2020-11-03 ENCOUNTER — OFFICE VISIT (OUTPATIENT)
Dept: ENDOCRINOLOGY | Age: 45
End: 2020-11-03

## 2020-11-03 VITALS
HEIGHT: 75 IN | WEIGHT: 315 LBS | RESPIRATION RATE: 16 BRPM | DIASTOLIC BLOOD PRESSURE: 84 MMHG | SYSTOLIC BLOOD PRESSURE: 152 MMHG | BODY MASS INDEX: 39.17 KG/M2

## 2020-11-03 DIAGNOSIS — E78.2 HYPERLIPEMIA, MIXED: ICD-10-CM

## 2020-11-03 DIAGNOSIS — E66.09 CLASS 2 OBESITY DUE TO EXCESS CALORIES WITHOUT SERIOUS COMORBIDITY WITH BODY MASS INDEX (BMI) OF 36.0 TO 36.9 IN ADULT: ICD-10-CM

## 2020-11-03 DIAGNOSIS — IMO0002 DM (DIABETES MELLITUS), TYPE 2, UNCONTROLLED W/NEUROLOGIC COMPLICATION: Primary | ICD-10-CM

## 2020-11-03 PROCEDURE — 99204 OFFICE O/P NEW MOD 45 MIN: CPT | Performed by: INTERNAL MEDICINE

## 2020-11-03 RX ORDER — SEMAGLUTIDE 1.34 MG/ML
1 INJECTION, SOLUTION SUBCUTANEOUS WEEKLY
Qty: 2 PEN | Refills: 11 | Status: SHIPPED | OUTPATIENT
Start: 2020-11-03 | End: 2021-11-03

## 2020-11-03 RX ORDER — HYDROCHLOROTHIAZIDE 25 MG/1
25 TABLET ORAL
COMMUNITY
End: 2022-03-02

## 2020-11-03 RX ORDER — GLIMEPIRIDE 4 MG/1
4 TABLET ORAL 2 TIMES DAILY WITH MEALS
Qty: 60 TABLET | Refills: 11 | Status: SHIPPED | OUTPATIENT
Start: 2020-11-03 | End: 2021-11-11

## 2020-11-03 NOTE — PATIENT INSTRUCTIONS
Behavior modification or behavior therapy is considered to be an essential component of managing the patient with overweight or obesity. The goals are to help patients make long-term changes in their eating behavior by:    ?Modifying and monitoring their food intake    ?Modifying their physical activity    Behavioral treatment for the patient who has overweight or obesity seeks to:  ?Alter the environment  ?Alter environmental reinforcement contingencies  ?Shape eating behavior and physical activity    Elements of behavior change -- Comprehensive lifestyle interventions usually provide a structured behavioral program that includes a number of components. These can be broadly categorized as nutrition education and self-regulation.  Nutrition education focuses on providing motivation (why to) and facilitation (how to) towards a specific behavioral goal (eg, drinking water instead of sugary beverages) and often also encompasses physical activity behaviors in addition to nutrition behaviors. Motivating factors include social support, understanding the benefits of behavior change and risks of not changing behavior, and self-efficacy. Facilitating factors include knowledge and skills   Self-regulation includes a set of supportive behaviors that have been demonstrated to improve initiation and maintenance of a behavior change goal, such as:  ?Goal setting  ?Self-monitoring (keeping food diaries and activity records)  ?Controlling or modifying the stimuli that activate eating  ?Eating style (slowing down the eating process)  ?Behavioral daniel and reinforcement  ?Meal planning    Apps - My fitness pal and Calorie alexandru also will help in setting the weight loss and calorie requirements.     Increasing physical activity -- Exercise regimen-any follow-up 20 minutes for 4 to 5 days a week will also help and weight loss plan and healthy lifestyle

## 2020-11-03 NOTE — PROGRESS NOTES
Chief Complaint   Patient presents with   • Diabetes       Gerry Thomas 45 y.o. presents with Type 2 dm as a new patient. Consulted by Dr. Galloway    Type 2 dm - Diagnosed in 2009   today in clinic pt reports being on glyburide 10 mg twice daily with meals, metformin 1000 mg twice daily, Januvia 100 mg oral daily  FBG -does not know  Pre meals -does not know  Checks BG -has not been checking  Sensor -no  Dm retinopathy -no,Last eye exam -due for the eye exam  Dm nephropathy -no history  Dm neuropathy -occasional,Dm neuropathy meds -not on any meds  CAD -no  CVA -no  Episodes of hypoglycemia -none  Pt is physically active. weight has been stable.   Pt tries to follow DM diet for most part.   On Ace inb.    Hyperlipidemia  On simvastatin 20 mg oral daily    Reviewed primary care physician's/consulting physician documentation and lab results       I have reviewed the patient's allergies, medicines, past medical hx, family hx and social hx in detail.    Past Medical History:   Diagnosis Date   • Diabetes mellitus (CMS/HCC)    • Hypertension    • Psoriasis    • Sleep apnea     uses CPAP   • Type 2 diabetes mellitus (CMS/HCC)        History reviewed. No pertinent family history.    Social History     Socioeconomic History   • Marital status:      Spouse name: Not on file   • Number of children: Not on file   • Years of education: Not on file   • Highest education level: Not on file   Tobacco Use   • Smoking status: Never Smoker   • Smokeless tobacco: Never Used   Substance and Sexual Activity   • Alcohol use: Yes     Comment: socially   • Drug use: Never   • Sexual activity: Defer       No Known Allergies      Current Outpatient Medications:   •  amLODIPine (NORVASC) 10 MG tablet, Take 10 mg by mouth Daily., Disp: , Rfl:   •  aspirin 81 MG chewable tablet, Chew 81 mg Daily., Disp: , Rfl:   •  benazepril (LOTENSIN) 20 MG tablet, Take 40 mg by mouth 2 (two) times a day., Disp: , Rfl:   •  cloNIDine (CATAPRES) 0.3  "MG tablet, Take 0.3 mg by mouth 2 (Two) Times a Day., Disp: , Rfl:   •  escitalopram (LEXAPRO) 20 MG tablet, Take 20 mg by mouth Daily., Disp: , Rfl:   •  hydroCHLOROthiazide (HYDRODIURIL) 25 MG tablet, Take 25 mg by mouth., Disp: , Rfl:   •  metFORMIN (GLUCOPHAGE) 1000 MG tablet, Take 1,000 mg by mouth., Disp: , Rfl:   •  metoprolol tartrate (LOPRESSOR) 50 MG tablet, Take 50 mg by mouth 2 (Two) Times a Day., Disp: , Rfl:   •  simvastatin (ZOCOR) 20 MG tablet, Take 20 mg by mouth Every Night., Disp: , Rfl:   •  SITagliptin (JANUVIA) 100 MG tablet, Take 100 mg by mouth Daily., Disp: , Rfl:   •  glimepiride (Amaryl) 4 MG tablet, Take 1 tablet by mouth 2 (Two) Times a Day With Meals., Disp: 60 tablet, Rfl: 11  •  Semaglutide, 1 MG/DOSE, (Ozempic, 1 MG/DOSE,) 2 MG/1.5ML solution pen-injector, Inject 1 mg under the skin into the appropriate area as directed 1 (One) Time Per Week., Disp: 2 pen, Rfl: 11    Review of Systems   Constitutional: Positive for appetite change and fatigue.   Eyes: Negative for visual disturbance.   Respiratory: Negative for shortness of breath.    Cardiovascular: Negative for palpitations.   Gastrointestinal: Negative for nausea and vomiting.   Endocrine: Negative for polydipsia and polyuria.   Musculoskeletal: Negative for arthralgias.   Skin: Negative for pallor and wound.   Neurological: Negative for weakness and numbness.   Psychiatric/Behavioral: Negative for agitation.        I have reviewed and confirmed the accuracy of the ROS as documented by the MA/LPN/RN Dimitri Turner MD    Objective:     /84   Resp 16   Ht 190.5 cm (75\")   Wt (!) 184 kg (406 lb)   BMI 50.75 kg/m²     Physical Exam  Vitals signs reviewed.   Constitutional:       Appearance: Normal appearance. He is not diaphoretic.   HENT:      Head: Normocephalic and atraumatic.   Eyes:      General: No scleral icterus.     Conjunctiva/sclera: Conjunctivae normal.   Neck:      Musculoskeletal: Normal range of motion and " neck supple.      Thyroid: No thyromegaly.      Comments: Wide neck  Cardiovascular:      Rate and Rhythm: Normal rate.   Pulmonary:      Effort: Pulmonary effort is normal. No respiratory distress.   Abdominal:      General: There is no distension.      Palpations: Abdomen is soft.      Tenderness: There is no abdominal tenderness.      Comments: Central obesity   Musculoskeletal:         General: No tenderness or deformity.   Skin:     General: Skin is warm and dry.   Neurological:      Mental Status: He is alert and oriented to person, place, and time. Mental status is at baseline.      Gait: Gait normal.      Comments: Decreased sensations   Psychiatric:         Mood and Affect: Mood normal.         Behavior: Behavior normal.            Results Review:    I reviewed the patient's new clinical results.    Admission on 08/23/2020, Discharged on 08/26/2020   Component Date Value Ref Range Status   • Glucose 08/23/2020 204* 65 - 99 mg/dL Final   • BUN 08/23/2020 18  6 - 20 mg/dL Final   • Creatinine 08/23/2020 1.56* 0.76 - 1.27 mg/dL Final   • Sodium 08/23/2020 129* 136 - 145 mmol/L Final   • Potassium 08/23/2020 3.0* 3.5 - 5.2 mmol/L Final   • Chloride 08/23/2020 91* 98 - 107 mmol/L Final   • CO2 08/23/2020 26.3  22.0 - 29.0 mmol/L Final   • Calcium 08/23/2020 9.7  8.6 - 10.5 mg/dL Final   • Total Protein 08/23/2020 7.4  6.0 - 8.5 g/dL Final   • Albumin 08/23/2020 4.10  3.50 - 5.20 g/dL Final   • ALT (SGPT) 08/23/2020 10  1 - 41 U/L Final   • AST (SGOT) 08/23/2020 10  1 - 40 U/L Final   • Alkaline Phosphatase 08/23/2020 65  39 - 117 U/L Final   • Total Bilirubin 08/23/2020 0.3  0.0 - 1.2 mg/dL Final   • eGFR Non  Amer 08/23/2020 48* >60 mL/min/1.73 Final   • Globulin 08/23/2020 3.3  gm/dL Final   • A/G Ratio 08/23/2020 1.2  g/dL Final   • BUN/Creatinine Ratio 08/23/2020 11.5  7.0 - 25.0 Final   • Anion Gap 08/23/2020 11.7  5.0 - 15.0 mmol/L Final   • Protime 08/23/2020 13.4  11.7 - 14.2 Seconds Final   • INR  08/23/2020 1.03  0.90 - 1.10 Final   • Blood Culture 08/23/2020 No growth at 5 days   Final   • Blood Culture 08/23/2020 No growth at 5 days   Final   • Lactate 08/23/2020 3.1* 0.5 - 2.0 mmol/L Final   • Procalcitonin 08/23/2020 0.10  0.00 - 0.25 ng/mL Final   • Sed Rate 08/23/2020 58* 0 - 15 mm/hr Final   • C-Reactive Protein 08/23/2020 1.08* 0.00 - 0.50 mg/dL Final   • ADENOVIRUS, PCR 08/23/2020 Not Detected  Not Detected Final   • Coronavirus 229E 08/23/2020 Not Detected  Not Detected Final   • Coronavirus HKU1 08/23/2020 Not Detected  Not Detected Final   • Coronavirus NL63 08/23/2020 Not Detected  Not Detected Final   • Coronavirus OC43 08/23/2020 Not Detected  Not Detected Final   • COVID19 08/23/2020 Not Detected  Not Detected - Ref. Range Final   • Human Metapneumovirus 08/23/2020 Not Detected  Not Detected Final   • Human Rhinovirus/Enterovirus 08/23/2020 Not Detected  Not Detected Final   • Influenza A PCR 08/23/2020 Not Detected  Not Detected Final   • Influenza A H1 08/23/2020 Not Detected  Not Detected Final   • Influenza A H1 2009 PCR 08/23/2020 Not Detected  Not Detected Final   • Influenza A H3 08/23/2020 Not Detected  Not Detected Final   • Influenza B PCR 08/23/2020 Not Detected  Not Detected Final   • Parainfluenza Virus 1 08/23/2020 Not Detected  Not Detected Final   • Parainfluenza Virus 2 08/23/2020 Not Detected  Not Detected Final   • Parainfluenza Virus 3 08/23/2020 Not Detected  Not Detected Final   • Parainfluenza Virus 4 08/23/2020 Not Detected  Not Detected Final   • RSV, PCR 08/23/2020 Not Detected  Not Detected Final   • Bordetella pertussis pcr 08/23/2020 Not Detected  Not Detected Final   • Bordetella parapertussis PCR 08/23/2020 Not Detected  Not Detected Final   • Chlamydophila pneumoniae PCR 08/23/2020 Not Detected  Not Detected Final   • Mycoplasma pneumo by PCR 08/23/2020 Not Detected  Not Detected Final   • WBC 08/23/2020 7.76  3.40 - 10.80 10*3/mm3 Final   • RBC 08/23/2020 4.83   4.14 - 5.80 10*6/mm3 Final   • Hemoglobin 08/23/2020 12.9* 13.0 - 17.7 g/dL Final   • Hematocrit 08/23/2020 40.6  37.5 - 51.0 % Final   • MCV 08/23/2020 84.1  79.0 - 97.0 fL Final   • MCH 08/23/2020 26.7  26.6 - 33.0 pg Final   • MCHC 08/23/2020 31.8  31.5 - 35.7 g/dL Final   • RDW 08/23/2020 15.0  12.3 - 15.4 % Final   • RDW-SD 08/23/2020 45.5  37.0 - 54.0 fl Final   • MPV 08/23/2020 9.9  6.0 - 12.0 fL Final   • Platelets 08/23/2020 192  140 - 450 10*3/mm3 Final   • Neutrophil % 08/23/2020 69.9  42.7 - 76.0 % Final   • Lymphocyte % 08/23/2020 16.8* 19.6 - 45.3 % Final   • Monocyte % 08/23/2020 8.0  5.0 - 12.0 % Final   • Eosinophil % 08/23/2020 3.9  0.3 - 6.2 % Final   • Basophil % 08/23/2020 0.8  0.0 - 1.5 % Final   • Immature Grans % 08/23/2020 0.6* 0.0 - 0.5 % Final   • Neutrophils, Absolute 08/23/2020 5.43  1.70 - 7.00 10*3/mm3 Final   • Lymphocytes, Absolute 08/23/2020 1.30  0.70 - 3.10 10*3/mm3 Final   • Monocytes, Absolute 08/23/2020 0.62  0.10 - 0.90 10*3/mm3 Final   • Eosinophils, Absolute 08/23/2020 0.30  0.00 - 0.40 10*3/mm3 Final   • Basophils, Absolute 08/23/2020 0.06  0.00 - 0.20 10*3/mm3 Final   • Immature Grans, Absolute 08/23/2020 0.05  0.00 - 0.05 10*3/mm3 Final   • nRBC 08/23/2020 0.0  0.0 - 0.2 /100 WBC Final   • Color, UA 08/23/2020 Yellow  Yellow, Straw Final   • Appearance, UA 08/23/2020 Clear  Clear Final   • pH, UA 08/23/2020 <=5.0  5.0 - 8.0 Final   • Specific Gravity, UA 08/23/2020 1.027  1.005 - 1.030 Final   • Glucose, UA 08/23/2020 Negative  Negative Final   • Ketones, UA 08/23/2020 Negative  Negative Final   • Bilirubin, UA 08/23/2020 Negative  Negative Final   • Blood, UA 08/23/2020 Negative  Negative Final   • Protein, UA 08/23/2020 >=300 mg/dL (3+)* Negative Final   • Leuk Esterase, UA 08/23/2020 Negative  Negative Final   • Nitrite, UA 08/23/2020 Negative  Negative Final   • Urobilinogen, UA 08/23/2020 0.2 E.U./dL  0.2 - 1.0 E.U./dL Final   • Hold Tube 08/23/2020 Hold for  add-ons.   Final    Auto resulted.   • WBC 08/24/2020 6.62  3.40 - 10.80 10*3/mm3 Final   • RBC 08/24/2020 4.66  4.14 - 5.80 10*6/mm3 Final   • Hemoglobin 08/24/2020 12.7* 13.0 - 17.7 g/dL Final   • Hematocrit 08/24/2020 37.7  37.5 - 51.0 % Final   • MCV 08/24/2020 80.9  79.0 - 97.0 fL Final   • MCH 08/24/2020 27.3  26.6 - 33.0 pg Final   • MCHC 08/24/2020 33.7  31.5 - 35.7 g/dL Final   • RDW 08/24/2020 14.9  12.3 - 15.4 % Final   • RDW-SD 08/24/2020 43.2  37.0 - 54.0 fl Final   • MPV 08/24/2020 9.9  6.0 - 12.0 fL Final   • Platelets 08/24/2020 174  140 - 450 10*3/mm3 Final   • Neutrophil % 08/24/2020 67.4  42.7 - 76.0 % Final   • Lymphocyte % 08/24/2020 19.9  19.6 - 45.3 % Final   • Monocyte % 08/24/2020 7.4  5.0 - 12.0 % Final   • Eosinophil % 08/24/2020 3.9  0.3 - 6.2 % Final   • Basophil % 08/24/2020 0.6  0.0 - 1.5 % Final   • Immature Grans % 08/24/2020 0.8* 0.0 - 0.5 % Final   • Neutrophils, Absolute 08/24/2020 4.46  1.70 - 7.00 10*3/mm3 Final   • Lymphocytes, Absolute 08/24/2020 1.32  0.70 - 3.10 10*3/mm3 Final   • Monocytes, Absolute 08/24/2020 0.49  0.10 - 0.90 10*3/mm3 Final   • Eosinophils, Absolute 08/24/2020 0.26  0.00 - 0.40 10*3/mm3 Final   • Basophils, Absolute 08/24/2020 0.04  0.00 - 0.20 10*3/mm3 Final   • Immature Grans, Absolute 08/24/2020 0.05  0.00 - 0.05 10*3/mm3 Final   • nRBC 08/24/2020 0.0  0.0 - 0.2 /100 WBC Final   • Glucose 08/24/2020 149* 65 - 99 mg/dL Final   • BUN 08/24/2020 17  6 - 20 mg/dL Final   • Creatinine 08/24/2020 1.52* 0.76 - 1.27 mg/dL Final   • Sodium 08/24/2020 131* 136 - 145 mmol/L Final   • Potassium 08/24/2020 2.8* 3.5 - 5.2 mmol/L Final   • Chloride 08/24/2020 96* 98 - 107 mmol/L Final   • CO2 08/24/2020 24.6  22.0 - 29.0 mmol/L Final   • Calcium 08/24/2020 8.7  8.6 - 10.5 mg/dL Final   • eGFR Non African Amer 08/24/2020 50* >60 mL/min/1.73 Final   • BUN/Creatinine Ratio 08/24/2020 11.2  7.0 - 25.0 Final   • Anion Gap 08/24/2020 10.4  5.0 - 15.0 mmol/L Final   • RBC,  UA 08/23/2020 None Seen  None Seen, 0-2 /HPF Final   • WBC, UA 08/23/2020 None Seen  None Seen, 0-2 /HPF Final   • Bacteria, UA 08/23/2020 None Seen  None Seen /HPF Final   • Squamous Epithelial Cells, UA 08/23/2020 0-2  None Seen, 0-2 /HPF Final   • Hyaline Casts, UA 08/23/2020 None Seen  None Seen /LPF Final   • Methodology 08/23/2020 Manual Light Microscopy   Final   • Hemoglobin A1C 08/24/2020 9.90* 4.80 - 5.60 % Final   • Lactate 08/24/2020 1.1  0.5 - 2.0 mmol/L Final   • Glucose 08/24/2020 157* 70 - 130 mg/dL Final   • Glucose 08/24/2020 195* 70 - 130 mg/dL Final   • Glucose 08/24/2020 197* 70 - 130 mg/dL Final   • Potassium 08/24/2020 3.3* 3.5 - 5.2 mmol/L Final   • Glucose 08/24/2020 202* 70 - 130 mg/dL Final   • Creatinine 08/25/2020 1.18  0.76 - 1.27 mg/dL Final   • eGFR Non African Amer 08/25/2020 66  >60 mL/min/1.73 Final   • Potassium 08/25/2020 3.8  3.5 - 5.2 mmol/L Final   • Glucose 08/25/2020 190* 70 - 130 mg/dL Final   • Glucose 08/25/2020 244* 70 - 130 mg/dL Final   • Glucose 08/25/2020 259* 70 - 130 mg/dL Final   • Glucose 08/25/2020 269* 70 - 130 mg/dL Final   • Glucose 08/26/2020 214* 65 - 99 mg/dL Final   • BUN 08/26/2020 9  6 - 20 mg/dL Final   • Creatinine 08/26/2020 0.93  0.76 - 1.27 mg/dL Final   • Sodium 08/26/2020 138  136 - 145 mmol/L Final   • Potassium 08/26/2020 3.6  3.5 - 5.2 mmol/L Final   • Chloride 08/26/2020 106  98 - 107 mmol/L Final   • CO2 08/26/2020 23.7  22.0 - 29.0 mmol/L Final   • Calcium 08/26/2020 8.5* 8.6 - 10.5 mg/dL Final   • eGFR Non  Amer 08/26/2020 87  >60 mL/min/1.73 Final   • BUN/Creatinine Ratio 08/26/2020 9.7  7.0 - 25.0 Final   • Anion Gap 08/26/2020 8.3  5.0 - 15.0 mmol/L Final   • WBC 08/26/2020 4.83  3.40 - 10.80 10*3/mm3 Final   • RBC 08/26/2020 4.22  4.14 - 5.80 10*6/mm3 Final   • Hemoglobin 08/26/2020 11.6* 13.0 - 17.7 g/dL Final   • Hematocrit 08/26/2020 35.4* 37.5 - 51.0 % Final   • MCV 08/26/2020 83.9  79.0 - 97.0 fL Final   • MCH 08/26/2020  27.5  26.6 - 33.0 pg Final   • MCHC 08/26/2020 32.8  31.5 - 35.7 g/dL Final   • RDW 08/26/2020 14.7  12.3 - 15.4 % Final   • RDW-SD 08/26/2020 44.8  37.0 - 54.0 fl Final   • MPV 08/26/2020 10.4  6.0 - 12.0 fL Final   • Platelets 08/26/2020 140  140 - 450 10*3/mm3 Final   • Magnesium 08/26/2020 1.9  1.6 - 2.6 mg/dL Final   • Glucose 08/26/2020 205* 70 - 130 mg/dL Final   • Vancomycin Trough 08/26/2020 6.30  5.00 - 20.00 mcg/mL Final   • Glucose 08/26/2020 233* 70 - 130 mg/dL Final       Diagnoses and all orders for this visit:    1. DM (diabetes mellitus), type 2, uncontrolled w/neurologic complication (CMS/[facility]) (Primary)  -     Hemoglobin A1c; Future  -     Creatinine, Serum; Future  -     eGFR-Glomerular Filtration; Future  -     Lipid Panel; Future  -     Microalbumin / Creatinine Urine Ratio - Urine, Clean Catch; Future  -     TSH; Future  -     Vitamin B12 & Folate; Future  -     Vitamin D 25 Hydroxy; Future  -     T4, Free; Future    2. Class 2 obesity due to excess calories without serious comorbidity with body mass index (BMI) of 36.0 to 36.9 in adult  -     Hemoglobin A1c; Future  -     Creatinine, Serum; Future  -     eGFR-Glomerular Filtration; Future  -     Lipid Panel; Future  -     Microalbumin / Creatinine Urine Ratio - Urine, Clean Catch; Future  -     TSH; Future  -     Vitamin B12 & Folate; Future  -     Vitamin D 25 Hydroxy; Future  -     T4, Free; Future    3. Hyperlipemia, mixed  -     Hemoglobin A1c; Future  -     Creatinine, Serum; Future  -     eGFR-Glomerular Filtration; Future  -     Lipid Panel; Future  -     Microalbumin / Creatinine Urine Ratio - Urine, Clean Catch; Future  -     TSH; Future  -     Vitamin B12 & Folate; Future  -     Vitamin D 25 Hydroxy; Future  -     T4, Free; Future    Other orders  -     glimepiride (Amaryl) 4 MG tablet; Take 1 tablet by mouth 2 (Two) Times a Day With Meals.  Dispense: 60 tablet; Refill: 11  -     Semaglutide, 1 MG/DOSE, (Ozempic, 1 MG/DOSE,) 2  "MG/1.5ML solution pen-injector; Inject 1 mg under the skin into the appropriate area as directed 1 (One) Time Per Week.  Dispense: 2 pen; Refill: 11      Type 2 diabetes mellitus-severely uncontrolled  HbA1c is high  Continue Metformin  Discontinue glyburide  Start glimepiride 4 mg twice daily with meals  Start Ozempic.  Continue Januvia.    Discussed the benefits and the side effects of these medications    Advised the patient to start checking his blood sugars at least 2 times a day.    Placed continuous glucose monitoring on the patient to further assess his blood glucose trends.    Hyperlipidemia  Continue simvastatin    Obesity  Gave handout in AVS about calorie counting and exercise regimen to help with the weight loss.  Thank you for asking me to see your patient, Gerry Thomas in consultation.        Dimitri Turner MD  11/03/20    EMR Dragon / transcription disclaimer:     \"Dictated utilizing Dragon dictation\".   "

## 2020-11-20 ENCOUNTER — TREATMENT (OUTPATIENT)
Dept: ENDOCRINOLOGY | Age: 45
End: 2020-11-20

## 2020-11-20 DIAGNOSIS — E11.65 TYPE 2 DIABETES MELLITUS WITH HYPERGLYCEMIA, WITHOUT LONG-TERM CURRENT USE OF INSULIN (HCC): ICD-10-CM

## 2020-11-20 PROCEDURE — 95250 CONT GLUC MNTR PHYS/QHP EQP: CPT | Performed by: INTERNAL MEDICINE

## 2020-11-20 PROCEDURE — 95251 CONT GLUC MNTR ANALYSIS I&R: CPT | Performed by: INTERNAL MEDICINE

## 2020-11-20 NOTE — PROGRESS NOTES
Continues glucose monitoring data    Continuous glucose monitoring was placed on 11/30/2020 by Hilda Espinosa  Patient has been trained/educated by the MA regarding the CGM    Patient wore continuous glucose monitoring-free style gideon Pro from 11/30/2020-11/17/2020  Average blood glucose reading was 212 mg/dL range  Estimated HbA1c 8.4%  Likelihood of low blood glucose levels was low  Likelihood of high blood glucose levels was high  Blood glucose trends showed high blood sugars throughout the day    Current treatment regimen  Continue Metformin  glimepiride 4 mg twice daily with meals  Ozempic.  Continue Januvia.    Changes to the treatment regimen  Continue metformin, glimepiride and Januvia  Continue Ozempic.  We will add Farxiga 10 mg oral daily.  The medication could increase the amount of urination, would recommend the patient to increase his hydration with water.    Please send in the prescription of Farxiga after talking to the patient.

## 2021-04-26 ENCOUNTER — TELEPHONE (OUTPATIENT)
Dept: ENDOCRINOLOGY | Age: 46
End: 2021-04-26

## 2021-11-08 RX ORDER — SEMAGLUTIDE 1.34 MG/ML
INJECTION, SOLUTION SUBCUTANEOUS
Qty: 3 ML | Refills: 0 | Status: SHIPPED | OUTPATIENT
Start: 2021-11-08 | End: 2022-07-18 | Stop reason: SDUPTHER

## 2021-11-11 RX ORDER — GLIMEPIRIDE 4 MG/1
TABLET ORAL
Qty: 60 TABLET | Refills: 11 | Status: SHIPPED | OUTPATIENT
Start: 2021-11-11 | End: 2022-07-18 | Stop reason: SDUPTHER

## 2022-01-04 RX ORDER — SEMAGLUTIDE 1.34 MG/ML
INJECTION, SOLUTION SUBCUTANEOUS
Qty: 3 ML | Refills: 0 | OUTPATIENT
Start: 2022-01-04

## 2022-02-28 ENCOUNTER — HOSPITAL ENCOUNTER (OUTPATIENT)
Dept: CT IMAGING | Facility: HOSPITAL | Age: 47
Discharge: HOME | End: 2022-02-28
Admitting: FAMILY MEDICINE

## 2022-02-28 ENCOUNTER — TRANSCRIBE ORDERS (OUTPATIENT)
Dept: ADMINISTRATIVE | Facility: HOSPITAL | Age: 47
End: 2022-02-28

## 2022-02-28 DIAGNOSIS — R06.02 SHORTNESS OF BREATH: Primary | ICD-10-CM

## 2022-02-28 DIAGNOSIS — R06.02 SHORTNESS OF BREATH: ICD-10-CM

## 2022-02-28 LAB — CREAT BLDA-MCNC: 1.2 MG/DL (ref 0.6–1.3)

## 2022-02-28 PROCEDURE — 82565 ASSAY OF CREATININE: CPT

## 2022-02-28 PROCEDURE — 0 IOPAMIDOL PER 1 ML: Performed by: FAMILY MEDICINE

## 2022-02-28 PROCEDURE — 71275 CT ANGIOGRAPHY CHEST: CPT

## 2022-02-28 RX ADMIN — IOPAMIDOL 98 ML: 755 INJECTION, SOLUTION INTRAVENOUS at 14:51

## 2022-03-02 ENCOUNTER — HOSPITAL ENCOUNTER (OUTPATIENT)
Dept: CARDIOLOGY | Facility: HOSPITAL | Age: 47
Discharge: HOME | End: 2022-03-02

## 2022-03-02 ENCOUNTER — OFFICE VISIT (OUTPATIENT)
Dept: CARDIOLOGY | Facility: CLINIC | Age: 47
End: 2022-03-02

## 2022-03-02 VITALS
DIASTOLIC BLOOD PRESSURE: 88 MMHG | HEART RATE: 56 BPM | BODY MASS INDEX: 39.17 KG/M2 | SYSTOLIC BLOOD PRESSURE: 140 MMHG | WEIGHT: 315 LBS | HEIGHT: 75 IN

## 2022-03-02 DIAGNOSIS — I50.31 ACUTE DIASTOLIC CHF (CONGESTIVE HEART FAILURE): Primary | ICD-10-CM

## 2022-03-02 DIAGNOSIS — E66.01 MORBID OBESITY WITH BMI OF 45.0-49.9, ADULT: ICD-10-CM

## 2022-03-02 DIAGNOSIS — I50.31 ACUTE DIASTOLIC CHF (CONGESTIVE HEART FAILURE): ICD-10-CM

## 2022-03-02 DIAGNOSIS — I10 ESSENTIAL HYPERTENSION: ICD-10-CM

## 2022-03-02 DIAGNOSIS — E11.65 TYPE 2 DIABETES MELLITUS WITH HYPERGLYCEMIA, WITHOUT LONG-TERM CURRENT USE OF INSULIN: ICD-10-CM

## 2022-03-02 LAB
ALBUMIN SERPL-MCNC: 3.8 G/DL (ref 3.5–5.2)
ALBUMIN/GLOB SERPL: 1.3 G/DL
ALP SERPL-CCNC: 89 U/L (ref 39–117)
ALT SERPL W P-5'-P-CCNC: 12 U/L (ref 1–41)
ANION GAP SERPL CALCULATED.3IONS-SCNC: 12 MMOL/L (ref 5–15)
ASCENDING AORTA: 3.8 CM
AST SERPL-CCNC: 21 U/L (ref 1–40)
BASOPHILS # BLD AUTO: 0.06 10*3/MM3 (ref 0–0.2)
BASOPHILS NFR BLD AUTO: 0.9 % (ref 0–1.5)
BH CV ECHO MEAS - ACS: 2.5 CM
BH CV ECHO MEAS - AO MAX PG: 4.8 MMHG
BH CV ECHO MEAS - AO MEAN PG: 2.8 MMHG
BH CV ECHO MEAS - AO ROOT DIAM: 4 CM
BH CV ECHO MEAS - AO V2 MAX: 109.9 CM/SEC
BH CV ECHO MEAS - AO V2 VTI: 24.2 CM
BH CV ECHO MEAS - AVA(I,D): 4.5 CM2
BH CV ECHO MEAS - EDV(CUBED): 199.7 ML
BH CV ECHO MEAS - EDV(MOD-SP2): 201 ML
BH CV ECHO MEAS - EDV(MOD-SP4): 127 ML
BH CV ECHO MEAS - EF(MOD-BP): 65.1 %
BH CV ECHO MEAS - EF(MOD-SP2): 64.2 %
BH CV ECHO MEAS - EF(MOD-SP4): 63 %
BH CV ECHO MEAS - ESV(CUBED): 51.2 ML
BH CV ECHO MEAS - ESV(MOD-SP2): 72 ML
BH CV ECHO MEAS - ESV(MOD-SP4): 47 ML
BH CV ECHO MEAS - FS: 36.5 %
BH CV ECHO MEAS - IVS/LVPW: 1.08 CM
BH CV ECHO MEAS - IVSD: 1.8 CM
BH CV ECHO MEAS - LAT PEAK E' VEL: 7.1 CM/SEC
BH CV ECHO MEAS - LV DIASTOLIC VOL/BSA (35-75): 43 CM2
BH CV ECHO MEAS - LV MASS(C)D: 506.3 GRAMS
BH CV ECHO MEAS - LV MAX PG: 3.3 MMHG
BH CV ECHO MEAS - LV MEAN PG: 2.02 MMHG
BH CV ECHO MEAS - LV SYSTOLIC VOL/BSA (12-30): 15.9 CM2
BH CV ECHO MEAS - LV V1 MAX: 90.3 CM/SEC
BH CV ECHO MEAS - LV V1 VTI: 21 CM
BH CV ECHO MEAS - LVIDD: 5.8 CM
BH CV ECHO MEAS - LVIDS: 3.7 CM
BH CV ECHO MEAS - LVOT AREA: 5.2 CM2
BH CV ECHO MEAS - LVOT DIAM: 2.6 CM
BH CV ECHO MEAS - LVPWD: 1.67 CM
BH CV ECHO MEAS - MED PEAK E' VEL: 7.5 CM/SEC
BH CV ECHO MEAS - MV A DUR: 0.1 SEC
BH CV ECHO MEAS - MV A MAX VEL: 56.9 CM/SEC
BH CV ECHO MEAS - MV DEC SLOPE: 529.1 CM/SEC2
BH CV ECHO MEAS - MV DEC TIME: 0.15 MSEC
BH CV ECHO MEAS - MV E MAX VEL: 102 CM/SEC
BH CV ECHO MEAS - MV E/A: 1.79
BH CV ECHO MEAS - MV MAX PG: 3.5 MMHG
BH CV ECHO MEAS - MV MEAN PG: 1.36 MMHG
BH CV ECHO MEAS - MV V2 VTI: 31.8 CM
BH CV ECHO MEAS - MVA(VTI): 3.4 CM2
BH CV ECHO MEAS - PA ACC TIME: 0.08 SEC
BH CV ECHO MEAS - PA PR(ACCEL): 42.6 MMHG
BH CV ECHO MEAS - PA V2 MAX: 116.7 CM/SEC
BH CV ECHO MEAS - PULM A REVS DUR: 0.11 SEC
BH CV ECHO MEAS - PULM A REVS VEL: 25 CM/SEC
BH CV ECHO MEAS - PULM DIAS VEL: 25.3 CM/SEC
BH CV ECHO MEAS - PULM SYS VEL: 37.8 CM/SEC
BH CV ECHO MEAS - RV MAX PG: 2.9 MMHG
BH CV ECHO MEAS - RV V1 MAX: 85.4 CM/SEC
BH CV ECHO MEAS - RV V1 VTI: 23.7 CM
BH CV ECHO MEAS - RVOT DIAM: 2.7 CM
BH CV ECHO MEAS - SI(MOD-SP2): 43.6 ML/M2
BH CV ECHO MEAS - SI(MOD-SP4): 27.1 ML/M2
BH CV ECHO MEAS - SV(LVOT): 108.8 ML
BH CV ECHO MEAS - SV(MOD-SP2): 129 ML
BH CV ECHO MEAS - SV(MOD-SP4): 80 ML
BH CV ECHO MEAS - SV(RVOT): 138.7 ML
BH CV ECHO MEAS - TAPSE (>1.6): 2.44 CM
BH CV ECHO MEASUREMENTS AVERAGE E/E' RATIO: 13.97
BH CV XLRA - RV BASE: 3.9 CM
BH CV XLRA - RV LENGTH: 9 CM
BH CV XLRA - RV MID: 4.1 CM
BH CV XLRA - TDI S': 13.1 CM/SEC
BILIRUB SERPL-MCNC: 0.6 MG/DL (ref 0–1.2)
BUN SERPL-MCNC: 12 MG/DL (ref 6–20)
BUN/CREAT SERPL: 10.5 (ref 7–25)
CALCIUM SPEC-SCNC: 8.8 MG/DL (ref 8.6–10.5)
CHLORIDE SERPL-SCNC: 96 MMOL/L (ref 98–107)
CO2 SERPL-SCNC: 29 MMOL/L (ref 22–29)
CREAT SERPL-MCNC: 1.14 MG/DL (ref 0.76–1.27)
DEPRECATED RDW RBC AUTO: 44.7 FL (ref 37–54)
EGFRCR SERPLBLD CKD-EPI 2021: 79.8 ML/MIN/1.73
EOSINOPHIL # BLD AUTO: 0.19 10*3/MM3 (ref 0–0.4)
EOSINOPHIL NFR BLD AUTO: 3 % (ref 0.3–6.2)
ERYTHROCYTE [DISTWIDTH] IN BLOOD BY AUTOMATED COUNT: 15.1 % (ref 12.3–15.4)
GLOBULIN UR ELPH-MCNC: 2.9 GM/DL
GLUCOSE SERPL-MCNC: 271 MG/DL (ref 65–99)
HCT VFR BLD AUTO: 42.8 % (ref 37.5–51)
HGB BLD-MCNC: 14 G/DL (ref 13–17.7)
IMM GRANULOCYTES # BLD AUTO: 0.05 10*3/MM3 (ref 0–0.05)
IMM GRANULOCYTES NFR BLD AUTO: 0.8 % (ref 0–0.5)
LEFT ATRIUM VOLUME INDEX: 37.2 ML/M2
LYMPHOCYTES # BLD AUTO: 0.97 10*3/MM3 (ref 0.7–3.1)
LYMPHOCYTES NFR BLD AUTO: 15.3 % (ref 19.6–45.3)
MAXIMAL PREDICTED HEART RATE: 173 BPM
MCH RBC QN AUTO: 27 PG (ref 26.6–33)
MCHC RBC AUTO-ENTMCNC: 32.7 G/DL (ref 31.5–35.7)
MCV RBC AUTO: 82.5 FL (ref 79–97)
MONOCYTES # BLD AUTO: 0.44 10*3/MM3 (ref 0.1–0.9)
MONOCYTES NFR BLD AUTO: 6.9 % (ref 5–12)
NEUTROPHILS NFR BLD AUTO: 4.63 10*3/MM3 (ref 1.7–7)
NEUTROPHILS NFR BLD AUTO: 73.1 % (ref 42.7–76)
NRBC BLD AUTO-RTO: 0 /100 WBC (ref 0–0.2)
NT-PROBNP SERPL-MCNC: 528 PG/ML (ref 0–450)
PLATELET # BLD AUTO: 164 10*3/MM3 (ref 140–450)
PMV BLD AUTO: 10.6 FL (ref 6–12)
POTASSIUM SERPL-SCNC: 3 MMOL/L (ref 3.5–5.2)
PROT SERPL-MCNC: 6.7 G/DL (ref 6–8.5)
RBC # BLD AUTO: 5.19 10*6/MM3 (ref 4.14–5.8)
SINUS: 3.8 CM
SODIUM SERPL-SCNC: 137 MMOL/L (ref 136–145)
STJ: 3.8 CM
STRESS TARGET HR: 147 BPM
T-UPTAKE NFR SERPL: 1.08 TBI (ref 0.8–1.3)
T4 SERPL-MCNC: 7.43 MCG/DL (ref 4.5–11.7)
TSH SERPL DL<=0.05 MIU/L-ACNC: 3.65 UIU/ML (ref 0.27–4.2)
WBC NRBC COR # BLD: 6.34 10*3/MM3 (ref 3.4–10.8)

## 2022-03-02 PROCEDURE — 93306 TTE W/DOPPLER COMPLETE: CPT

## 2022-03-02 PROCEDURE — 96374 THER/PROPH/DIAG INJ IV PUSH: CPT

## 2022-03-02 PROCEDURE — 25010000002 FUROSEMIDE PER 20 MG: Performed by: INTERNAL MEDICINE

## 2022-03-02 PROCEDURE — 93000 ELECTROCARDIOGRAM COMPLETE: CPT | Performed by: INTERNAL MEDICINE

## 2022-03-02 PROCEDURE — 84436 ASSAY OF TOTAL THYROXINE: CPT | Performed by: INTERNAL MEDICINE

## 2022-03-02 PROCEDURE — 25010000002 PERFLUTREN (DEFINITY) 8.476 MG IN SODIUM CHLORIDE (PF) 0.9 % 10 ML INJECTION: Performed by: INTERNAL MEDICINE

## 2022-03-02 PROCEDURE — 93306 TTE W/DOPPLER COMPLETE: CPT | Performed by: INTERNAL MEDICINE

## 2022-03-02 PROCEDURE — 80050 GENERAL HEALTH PANEL: CPT | Performed by: INTERNAL MEDICINE

## 2022-03-02 PROCEDURE — 83880 ASSAY OF NATRIURETIC PEPTIDE: CPT | Performed by: INTERNAL MEDICINE

## 2022-03-02 PROCEDURE — 84479 ASSAY OF THYROID (T3 OR T4): CPT | Performed by: INTERNAL MEDICINE

## 2022-03-02 PROCEDURE — 99205 OFFICE O/P NEW HI 60 MIN: CPT | Performed by: INTERNAL MEDICINE

## 2022-03-02 PROCEDURE — 36415 COLL VENOUS BLD VENIPUNCTURE: CPT

## 2022-03-02 RX ORDER — FUROSEMIDE 10 MG/ML
40 INJECTION INTRAMUSCULAR; INTRAVENOUS ONCE
Status: COMPLETED | OUTPATIENT
Start: 2022-03-02 | End: 2022-03-02

## 2022-03-02 RX ORDER — FUROSEMIDE 40 MG/1
40 TABLET ORAL 2 TIMES DAILY
Qty: 60 TABLET | Refills: 3 | Status: SHIPPED | OUTPATIENT
Start: 2022-03-02 | End: 2022-07-05

## 2022-03-02 RX ORDER — MULTIPLE VITAMINS W/ MINERALS TAB 9MG-400MCG
TAB ORAL DAILY
COMMUNITY
Start: 2022-01-01

## 2022-03-02 RX ADMIN — PERFLUTREN 1.5 ML: 6.52 INJECTION, SUSPENSION INTRAVENOUS at 13:38

## 2022-03-02 RX ADMIN — FUROSEMIDE 40 MG: 10 INJECTION, SOLUTION INTRAMUSCULAR; INTRAVENOUS at 12:50

## 2022-03-02 NOTE — PROGRESS NOTES
[facility]      Patient Name: Gerry Thomas  :1975  Age: 46 y.o.  Encounter Provider:  Tramaine Alicea Jr, MD      Chief Complaint:   Chief Complaint   Patient presents with   • Shortness of Breath         HPI  Gerry Thomas is a 46 y.o. male past medical history of morbid obesity, diabetes, hypertension and dyslipidemia who presents for initial evaluation of dyspnea on exertion and orthopnea.  He has had slow progression of exertional dyspnea over the past 2 years. He began to experience upper respiratory symptoms over the last week including congestion and cough.  He tested negative for Covid and was sent for a CTA chest with concern for pulmonary embolism.  No PE on the exam but evaluation of the lung parenchyma is interpreted as pulmonary edema with small bilateral effusions.  He admits to orthopnea and some mild chest discomfort when he lays down.  He states the discomfort feels like a sharp pain when taking deep inspiration.  He has no anginal symptoms.  He admits that his blood pressures been very poorly controlled over the past few years despite multiple medication changes made by Dr. Galloway.  He is not mindful of sodium or volume restriction.  Given the CT chest findings he was started on Lasix yesterday but he is uncertain of the dose. He has been unable to lose weight but admits that he has made no attempts at caloric restriction.  He is diagnosed with MONI and is compliant with CPAP.  He is a lifelong non-smoker who drinks socially and denies illicit drug use.  Last A1c was 8 which is good for him as it has been as high as 10 or 11.  He has no family history of premature coronary artery disease or sudden cardiac death.      The following portions of the patient's history were reviewed and updated as appropriate: allergies, current medications, past family history, past medical history, past social history, past surgical history and problem list.      Review of Systems  "  Constitutional: Positive for malaise/fatigue. Negative for chills and fever.   HENT: Negative for hoarse voice and sore throat.    Eyes: Negative for double vision and photophobia.   Cardiovascular: Positive for chest pain, dyspnea on exertion, leg swelling and orthopnea. Negative for near-syncope, palpitations, paroxysmal nocturnal dyspnea and syncope.   Respiratory: Positive for cough and shortness of breath. Negative for wheezing.    Skin: Negative for poor wound healing and rash.   Musculoskeletal: Negative for arthritis and joint swelling.   Gastrointestinal: Negative for bloating, abdominal pain, hematemesis and hematochezia.   Neurological: Negative for dizziness and focal weakness.   Psychiatric/Behavioral: Negative for depression and suicidal ideas.       OBJECTIVE:   Vital Signs  Vitals:    03/02/22 1101   BP: 140/88   Pulse: 56     Estimated body mass index is 50.45 kg/m² as calculated from the following:    Height as of this encounter: 190.5 cm (75\").    Weight as of this encounter: 183 kg (403 lb 9.6 oz).    Vitals reviewed.   Constitutional:       Appearance: Not in distress. Frail. Acutely ill-appearing.   Neck:      Comments: JVD is difficult to assess secondary to body habitus.  Pulmonary:      Effort: Pulmonary effort is normal.      Breath sounds: No wheezing. No rhonchi. No rales.      Comments: Decreased air entry bibasilar zones with no clear evidence for wheezes rhonchi or crackles  Chest:      Chest wall: Not tender to palpatation.   Cardiovascular:      PMI at left midclavicular line. Normal rate. Regular rhythm. Normal S1. Normal S2.      Murmurs: There is no murmur.      No gallop. No click. No rub.   Pulses:     Intact distal pulses.   Edema:     Peripheral edema present.  Abdominal:      General: Bowel sounds are normal.      Palpations: Abdomen is soft.      Tenderness: There is no abdominal tenderness.   Musculoskeletal: Normal range of motion.         General: No tenderness. Skin:   "   General: Skin is warm and dry.   Neurological:      General: No focal deficit present.      Mental Status: Alert and oriented to person, place and time.           ECG 12 Lead    Date/Time: 3/2/2022 4:52 PM  Performed by: Tramaine Alicea Jr., MD  Authorized by: Tramaine Alicea Jr., MD   Comparison: not compared with previous ECG   Previous ECG: no previous ECG available  Rhythm: sinus rhythm  Conduction: non-specific intraventricular conduction delay  Other findings: left ventricular hypertrophy with strain    Clinical impression: abnormal EKG                  ASSESSMENT:     Acute diastolic heart failure  Hypertensive heart disease with heart failure  Diabetes  Dyslipidemia  Morbid obesity  MONI on CPAP    PLAN OF CARE:     1. Acute diastolic heart failure -we transferred the patient over to the [facility] today to get IV diuretics.  Labs were unremarkable including a normal BNP but this is difficult to interpret in the setting of morbid obesity.  Echocardiogram today shows moderate to severe a symmetric septal hypertrophy consistent with hypertensive heart disease.  No significant valvular heart disease.  Normal left ventricular systolic function.  Diastolic function was indeterminate however he likely has advanced diastolic heart failure.  I will ask pulmonary to review films for me is the picture does not seem classic for pulmonary edema however it is very hard to tell with this gentleman.  We will stop hydrochlorothiazide and increase Lasix to 40 mg twice daily.  I will add potassium supplementation.  I will see him back in clinic in 1 week.  If not better with conventional therapy we will consider ischemic work-up.  2. Hypertension -not well controlled at this time.  He will check a twice daily blood pressure log.  Strict sodium and volume restriction is counseled.  3. Diabetes  4. Dyslipidemia  5. Morbid obesity    Return to clinic 1 week         Discharge Medications          Accurate as of March 2, 2022 11:07 AM. If  you have any questions, ask your nurse or doctor.            Continue These Medications      Instructions Start Date   amLODIPine 10 MG tablet  Commonly known as: NORVASC   10 mg, Oral, Daily      aspirin 81 MG chewable tablet   81 mg, Oral, Daily      benazepril 20 MG tablet  Commonly known as: LOTENSIN   40 mg, Oral, 2 times daily      cloNIDine 0.3 MG tablet  Commonly known as: CATAPRES   0.3 mg, Oral, 2 Times Daily      escitalopram 20 MG tablet  Commonly known as: LEXAPRO   20 mg, Oral, Daily      glimepiride 4 MG tablet  Commonly known as: AMARYL   TAKE ONE TABLET BY MOUTH TWICE A DAY WITH MEALS      hydroCHLOROthiazide 25 MG tablet  Commonly known as: HYDRODIURIL   25 mg, Oral      metFORMIN 1000 MG tablet  Commonly known as: GLUCOPHAGE   1,000 mg, Oral      metoprolol tartrate 50 MG tablet  Commonly known as: LOPRESSOR   50 mg, Oral, 2 Times Daily      One-A-Day Mens Pro Edge tablet tablet   Daily      Ozempic (1 MG/DOSE) 4 MG/3ML solution pen-injector  Generic drug: Semaglutide (1 MG/DOSE)   DIAL AND INJECT UNDER THE SKIN 1 MG WEEKLY      simvastatin 20 MG tablet  Commonly known as: ZOCOR   20 mg, Oral, Nightly      SITagliptin 100 MG tablet  Commonly known as: JANUVIA   100 mg, Oral, Daily             Thank you for allowing me to participate in the care of your patient,      Sincerely,   Tramaine Alicea MD  [facility]  03/02/22  11:07 EST

## 2022-03-08 ENCOUNTER — HOSPITAL ENCOUNTER (OUTPATIENT)
Dept: CARDIOLOGY | Facility: HOSPITAL | Age: 47
Discharge: HOME | End: 2022-03-08
Admitting: NURSE PRACTITIONER

## 2022-03-08 ENCOUNTER — OFFICE VISIT (OUTPATIENT)
Dept: CARDIOLOGY | Facility: CLINIC | Age: 47
End: 2022-03-08

## 2022-03-08 ENCOUNTER — TELEPHONE (OUTPATIENT)
Dept: CARDIOLOGY | Facility: CLINIC | Age: 47
End: 2022-03-08

## 2022-03-08 VITALS
SYSTOLIC BLOOD PRESSURE: 162 MMHG | DIASTOLIC BLOOD PRESSURE: 80 MMHG | BODY MASS INDEX: 39.17 KG/M2 | HEIGHT: 75 IN | WEIGHT: 315 LBS | HEART RATE: 54 BPM

## 2022-03-08 DIAGNOSIS — I10 ESSENTIAL HYPERTENSION: Primary | ICD-10-CM

## 2022-03-08 DIAGNOSIS — Z51.81 ENCOUNTER FOR THERAPEUTIC DRUG LEVEL MONITORING: ICD-10-CM

## 2022-03-08 LAB
ANION GAP SERPL CALCULATED.3IONS-SCNC: 12 MMOL/L (ref 5–15)
BUN SERPL-MCNC: 17 MG/DL (ref 6–20)
BUN/CREAT SERPL: 16.2 (ref 7–25)
CALCIUM SPEC-SCNC: 8.4 MG/DL (ref 8.6–10.5)
CHLORIDE SERPL-SCNC: 101 MMOL/L (ref 98–107)
CO2 SERPL-SCNC: 25 MMOL/L (ref 22–29)
CREAT SERPL-MCNC: 1.05 MG/DL (ref 0.76–1.27)
EGFRCR SERPLBLD CKD-EPI 2021: 88.1 ML/MIN/1.73
GLUCOSE SERPL-MCNC: 249 MG/DL (ref 65–99)
POTASSIUM SERPL-SCNC: 3.4 MMOL/L (ref 3.5–5.2)
SODIUM SERPL-SCNC: 138 MMOL/L (ref 136–145)

## 2022-03-08 PROCEDURE — 80048 BASIC METABOLIC PNL TOTAL CA: CPT | Performed by: NURSE PRACTITIONER

## 2022-03-08 PROCEDURE — 99214 OFFICE O/P EST MOD 30 MIN: CPT | Performed by: NURSE PRACTITIONER

## 2022-03-08 PROCEDURE — 36415 COLL VENOUS BLD VENIPUNCTURE: CPT

## 2022-03-08 PROCEDURE — 93000 ELECTROCARDIOGRAM COMPLETE: CPT | Performed by: NURSE PRACTITIONER

## 2022-03-08 RX ORDER — SIMVASTATIN 20 MG
20 TABLET ORAL NIGHTLY
Qty: 90 TABLET | Refills: 3 | Status: SHIPPED | OUTPATIENT
Start: 2022-03-08 | End: 2023-02-20 | Stop reason: SDUPTHER

## 2022-03-08 RX ORDER — BENAZEPRIL HYDROCHLORIDE 20 MG/1
20 TABLET ORAL 2 TIMES DAILY
Qty: 180 TABLET | Refills: 2 | Status: SHIPPED | OUTPATIENT
Start: 2022-03-08 | End: 2022-12-16

## 2022-03-08 RX ORDER — AMLODIPINE BESYLATE 10 MG/1
10 TABLET ORAL DAILY
Qty: 90 TABLET | Refills: 3 | Status: SHIPPED | OUTPATIENT
Start: 2022-03-08

## 2022-03-08 NOTE — PROGRESS NOTES
"Date of Office Visit: 22  Encounter Provider: KAREN Day  Place of Service: [facility] CARDIOLOGY  Patient Name: Gerry Thomas  :1975    Chief Complaint   Patient presents with   • Acute diastolic CHF   • Hypertension   • Follow-up   :     HPI: Gerry Thomas is a 46 y.o. male  with hypertension, hyperlipidemia, diabetes mellitus, obesity, obstructive sleep apnea, plaque psoriasis, and anxiety.  He is followed by Dr. Alicea.  I will visit with him for the first time and have reviewed his medical record.    He reported orthopnea and dyspnea on exertion.  He also had issues with uncontrolled hypertension.  He had an echocardiogram 3/2/2022 which showed moderate to severe asymmetric septal hypertrophy compatible with hypertensive heart disease.  His proBNP was normal.  His hydrochlorothiazide was stopped and he was started on Lasix 40 mg twice daily with potassium replacement.      He presents today for 1 week follow-up.  He states he ran out of amlodipine and benazepril a couple weeks ago.  He has improved shortness of breath, decreased lower extremity edema and his orthopnea has resolved.  He is overall feeling better on furosemide.  He denies chest pain.    No Known Allergies        Family and social history reviewed.     ROS  All other systems were reviewed and are negative          Objective:     Vitals:    22 0901   BP: 162/80   BP Location: Left arm   Patient Position: Sitting   Pulse: 54   Weight: (!) 182 kg (401 lb)   Height: 190.5 cm (75\")     Body mass index is 50.12 kg/m².    PHYSICAL EXAM:  Pulmonary:      Breath sounds: Examination of the right-lower field reveals decreased breath sounds. Examination of the left-lower field reveals decreased breath sounds. Decreased breath sounds present.   Cardiovascular:      Normal rate. Regular rhythm.           ECG 12 Lead    Date/Time: 3/8/2022 9:12 AM  Performed by: Corinna Lazo APRN  Authorized " by: Corinna Lazo APRN   Comparison: compared with previous ECG   Similar to previous ECG  Rhythm: sinus rhythm  Conduction: 1st degree AV block and non-specific intraventricular conduction delay  Other findings: left ventricular hypertrophy              Current Outpatient Medications   Medication Sig Dispense Refill   • amLODIPine (NORVASC) 10 MG tablet Take 1 tablet by mouth Daily. 90 tablet 3   • aspirin 81 MG chewable tablet Chew 81 mg Daily.     • benazepril (LOTENSIN) 20 MG tablet Take 1 tablet by mouth 2 (Two) Times a Day. 180 tablet 2   • cloNIDine (CATAPRES) 0.3 MG tablet Take 0.3 mg by mouth 2 (Two) Times a Day.     • escitalopram (LEXAPRO) 20 MG tablet Take 20 mg by mouth Daily.     • furosemide (LASIX) 40 MG tablet Take 1 tablet by mouth 2 (Two) Times a Day. 60 tablet 3   • glimepiride (AMARYL) 4 MG tablet TAKE ONE TABLET BY MOUTH TWICE A DAY WITH MEALS 60 tablet 11   • metFORMIN (GLUCOPHAGE) 1000 MG tablet Take 1,000 mg by mouth.     • metoprolol tartrate (LOPRESSOR) 50 MG tablet Take 50 mg by mouth 2 (Two) Times a Day.     • multivitamin with minerals tablet tablet Daily.     • Ozempic, 1 MG/DOSE, 4 MG/3ML solution pen-injector DIAL AND INJECT UNDER THE SKIN 1 MG WEEKLY 3 mL 0   • simvastatin (ZOCOR) 20 MG tablet Take 1 tablet by mouth Every Night. 90 tablet 3   • SITagliptin (JANUVIA) 100 MG tablet Take 1 tablet by mouth Daily. 30 tablet 0     No current facility-administered medications for this visit.     Assessment:       Diagnosis Plan   1. Essential hypertension  ECG 12 Lead   2. Encounter for therapeutic drug level monitoring  Basic Metabolic Panel        Orders Placed This Encounter   Procedures   • Basic Metabolic Panel     Standing Status:   Future     Standing Expiration Date:   3/8/2023     Order Specific Question:   Release to patient     Answer:   Immediate   • ECG 12 Lead     This order was created via procedure documentation     Order Specific Question:   Release to patient     Answer:    Immediate         Plan:   1.  46-year-old gentleman with acute diastolic heart failure secondary to moderate to severe hypertrophy compatible hypertensive heart disease.  He was started on Lasix 40 mg twice daily 1 week ago.  We will check BMP today to evaluate renal function and electrolyte status and if stable continue the same.  See following telephone encounter  2.  Hypertension blood pressure is high but he has been without amlodipine and benazepril.  I sent prescription on both he is continue other agents as listed on his list.  I have asked him to follow his blood pressure a few times a week and bring me a list of his readings in 4 to 6 weeks when he follows up  3.  Diabetes mellitus-he is following with endocrinology and I gave a limited supply of his Januvia for now since he is also been without that  4.  Hyperlipidemia on simvastatin  5.  Morbid obesity BMI 50.12  6.  Obstructive sleep apnea reports compliance with CPAP   7.  Anxiety on therapy  8.  Plaque psoriasis      Follow-up in 4 to 6 weeks call with questions or concerns          It has been a pleasure to participate in this patient's care.      Thank you,  KAREN Day      **I used Dragon to dictate this note:**

## 2022-03-08 NOTE — TELEPHONE ENCOUNTER
Left voicemail renal function is stable.  Potassium to slightly low at 3.4.  He has been out of benazepril so he is to get back on that continue current dose furosemide we will plan to repeat BMP in 4 to 6 weeks when he returns.  He is to call questions or concerns.

## 2022-04-13 ENCOUNTER — OFFICE VISIT (OUTPATIENT)
Dept: CARDIOLOGY | Facility: CLINIC | Age: 47
End: 2022-04-13

## 2022-04-13 VITALS
DIASTOLIC BLOOD PRESSURE: 98 MMHG | WEIGHT: 315 LBS | HEIGHT: 75 IN | SYSTOLIC BLOOD PRESSURE: 154 MMHG | BODY MASS INDEX: 39.17 KG/M2 | HEART RATE: 55 BPM

## 2022-04-13 DIAGNOSIS — I50.31 ACUTE DIASTOLIC CHF (CONGESTIVE HEART FAILURE): ICD-10-CM

## 2022-04-13 DIAGNOSIS — I10 ESSENTIAL HYPERTENSION: Primary | ICD-10-CM

## 2022-04-13 DIAGNOSIS — E11.65 TYPE 2 DIABETES MELLITUS WITH HYPERGLYCEMIA, WITHOUT LONG-TERM CURRENT USE OF INSULIN: ICD-10-CM

## 2022-04-13 DIAGNOSIS — I15.9 SECONDARY HYPERTENSION: ICD-10-CM

## 2022-04-13 DIAGNOSIS — Z51.81 ENCOUNTER FOR THERAPEUTIC DRUG LEVEL MONITORING: ICD-10-CM

## 2022-04-13 PROCEDURE — 99214 OFFICE O/P EST MOD 30 MIN: CPT | Performed by: NURSE PRACTITIONER

## 2022-04-13 PROCEDURE — 93000 ELECTROCARDIOGRAM COMPLETE: CPT | Performed by: NURSE PRACTITIONER

## 2022-04-13 RX ORDER — CLONIDINE HYDROCHLORIDE 0.3 MG/1
0.3 TABLET ORAL 3 TIMES DAILY
Qty: 90 TABLET | Refills: 5 | Status: SHIPPED | OUTPATIENT
Start: 2022-04-13 | End: 2022-08-23

## 2022-04-13 NOTE — PROGRESS NOTES
"Date of Office Visit: 22  Encounter Provider: KAREN Day  Place of Service: [facility] CARDIOLOGY  Patient Name: Gerry Thomas  :1975    Chief Complaint   Patient presents with   • Acute diastolic CHF (congestive heart failure)    • Hypertension   • Follow-up   :     HPI: Gerry Thomas is a 46 y.o. male  with hypertension, hyperlipidemia, diabetes mellitus, obesity, obstructive sleep apnea, plaque psoriasis, and anxiety.  He is followed by Dr. Alicea.  I will visit with him in follow up and have reviewed his medical record.     He reported orthopnea and dyspnea on exertion.  He also had issues with uncontrolled hypertension.  He had an echocardiogram 3/2/2022 which showed moderate to severe asymmetric septal hypertrophy compatible with hypertensive heart disease.  His proBNP was normal.  His hydrochlorothiazide was stopped and he was started on Lasix 40 mg twice daily with potassium replacement.      He presents today for reassessment.  He plans to get another blood pressure cuff because he does not trust his home readings which are much higher than what ours reported today.  He is not exercising.  He is wearing CPAP routinely.  Taking his medication as prescribed.  No increased lower extremity edema or chest pain.  He has no worsening dyspnea on exertion.              No Known Allergies        Family and social history reviewed.     ROS  All other systems were reviewed and are negative          Objective:     Vitals:    22 0804   BP: 154/98   BP Location: Left arm   Patient Position: Sitting   Pulse: 55   Weight: (!) 183 kg (404 lb)   Height: 190.5 cm (75\")     Body mass index is 50.5 kg/m².    PHYSICAL EXAM:  Pulmonary:      Effort: Pulmonary effort is normal.      Breath sounds: Normal breath sounds.   Cardiovascular:      Normal rate. Regular rhythm.           ECG 12 Lead    Date/Time: 2022 8:46 AM  Performed by: Corinna Lazo, " KAREN  Authorized by: Corinna Lazo APRN   Comparison: compared with previous ECG   Similar to previous ECG  Rhythm: sinus rhythm  Conduction: 1st degree AV block  Comments: No ischemic changes              Current Outpatient Medications   Medication Sig Dispense Refill   • amLODIPine (NORVASC) 10 MG tablet Take 1 tablet by mouth Daily. 90 tablet 3   • aspirin 81 MG chewable tablet Chew 81 mg Daily.     • benazepril (LOTENSIN) 20 MG tablet Take 1 tablet by mouth 2 (Two) Times a Day. 180 tablet 2   • cloNIDine (CATAPRES) 0.3 MG tablet Take 1 tablet by mouth 3 (Three) Times a Day. 90 tablet 5   • escitalopram (LEXAPRO) 20 MG tablet Take 20 mg by mouth Daily.     • furosemide (LASIX) 40 MG tablet Take 1 tablet by mouth 2 (Two) Times a Day. 60 tablet 3   • glimepiride (AMARYL) 4 MG tablet TAKE ONE TABLET BY MOUTH TWICE A DAY WITH MEALS 60 tablet 11   • metFORMIN (GLUCOPHAGE) 1000 MG tablet Take 1,000 mg by mouth 2 (Two) Times a Day.     • metoprolol tartrate (LOPRESSOR) 50 MG tablet Take 50 mg by mouth 2 (Two) Times a Day.     • multivitamin with minerals tablet tablet Daily.     • Ozempic, 1 MG/DOSE, 4 MG/3ML solution pen-injector DIAL AND INJECT UNDER THE SKIN 1 MG WEEKLY 3 mL 0   • simvastatin (ZOCOR) 20 MG tablet Take 1 tablet by mouth Every Night. 90 tablet 3   • SITagliptin (JANUVIA) 100 MG tablet Take 1 tablet by mouth Daily. 30 tablet 0     No current facility-administered medications for this visit.     Assessment:       Diagnosis Plan   1. Essential hypertension     2. Encounter for therapeutic drug level monitoring     3. Acute diastolic CHF (congestive heart failure) (HCC)     4. Type 2 diabetes mellitus with hyperglycemia, without long-term current use of insulin (HCC)     5. Secondary hypertension  Duplex Renal Artery - Bilateral Complete CAR        No orders of the defined types were placed in this encounter.        Plan:     1.  46-year-old gentleman with acute diastolic heart failure secondary to  moderate to severe hypertrophy compatible hypertensive heart disease.  He is stable on current dose Lasix  2.  Hypertension  he is now back on amlodipine and benazepril.  I will increase clonidine from 0.3 mg twice daily to 0.3 mg 3 times daily.  He reports in the past he had significant fatigue with clonidine patch.  He was not interested in changing his current agents today.  We will arrange for renal artery duplex and secondary hypertension labs  3.  Diabetes mellitus-he is following with endocrinology and I gave a limited supply of his Januvia for now since he is also been without that  4.  Hyperlipidemia on simvastatin  5.  Morbid obesity BMI 50.50-discussed how increasing his physical activity and starting a structured exercise regimen could help to lower his blood pressure but also be beneficial for weight loss  6.  Obstructive sleep apnea reports compliance with CPAP   7.  Anxiety on therapy  8.  Plaque psoriasis      Follow-up in 3 months          It has been a pleasure to participate in this patient's care.      Thank you,  KAREN Day      **I used Dragon to dictate this note:**

## 2022-04-26 ENCOUNTER — E-VISIT (OUTPATIENT)
Dept: ADMINISTRATIVE | Facility: OTHER | Age: 47
End: 2022-04-26

## 2022-04-26 ENCOUNTER — E-VISIT (OUTPATIENT)
Dept: FAMILY MEDICINE CLINIC | Facility: TELEHEALTH | Age: 47
End: 2022-04-26

## 2022-04-26 PROCEDURE — BRIGHTMDVISIT: Performed by: NURSE PRACTITIONER

## 2022-04-26 NOTE — EXTERNAL PATIENT INSTRUCTIONS
View Doctor's Note     Diagnosis   Bacterial conjunctivitis   My name is Jade Phillips. I'm a healthcare provider at [facility].   Based on your photos and interview responses, I see you have some common signs of bacterial conjunctivitis, including:    Eye redness    Eye discharge    Eyelid or eyelash crusting    Itchiness    A burning, liam, or gritty feeling in your eye   I've given you a doctor's note for 1 day. You may return to school or work after using antibiotics for at least 24 hours.   Medications   Your pharmacy   [facility] [address] [place] [address] [address] [facility] [address] [phone]     Prescription   Polymyxin B/trimethoprim ophthalmic solution (10,000 units/1mg/mL): Place 1 drop in affected eye 4 times a day for 7 days for infection. This medication is an antibiotic. Take it exactly as directed. You must complete the 7-day course of medication, even if you feel better after the first few days of treatment.    After reviewing your photos and responses, I'm confident the medication prescribed above will help with your current symptoms. As long as you use the medication as directed, you should start to feel better within 1 to 2 days. If your symptoms continue or get worse, schedule an appointment.   About your diagnosis   Conjunctivitis, also known as pink eye, is an inflammation of the clear membrane covering the eyeball and the inner eyelid. Bacterial conjunctivitis is an infection caused by some of the same bacteria that cause other common conditions, such as ear and sinus infections. These are the key things to know about bacterial conjunctivitis:    Bacterial conjunctivitis is highly contagious! Without proper hand washing and other preventive measures, it spreads quickly to others.    This condition often starts in one eye and can spread to the other eye within a few days.    The eye discharge tends to be thick and goopy. In fact, it's common to wake up in the morning and feel  "like your eyes are crusty, gooey, or even \"stuck shut.\"   What to expect   Fortunately, bacterial conjunctivitis is usually mild and easy to treat. With proper treatment, you should start feeling better within 1 to 2 days. It usually clears completely in 5 days.   When to seek care   Call us at [phone]   with any sudden or unexpected symptoms.    Difficulty opening your eye.    Moderate to severe eye pain, at rest or with eye movements.    Increase in swelling of the eye.    Vision changes that don't get better when you wipe your eye.    Sensitivity to bright lights, such as needing to shade your eyes by wearing a hat or sunglasses, holding up your hand to block out light, or keeping your head down and turned away from lights, lamps, or windows.    Redness in or around your eye that increases or gets worse. In rare cases, eye drops can make eye irritation worse.    A fever above 100.4F or that lasts for more than 4 days.    Your treatment plan isn't working. Your immune condition may mean that you need more care.   Other treatment    Use refrigerated artificial tears and cool compresses to help with eye dryness, itching, and inflammation.    Gently clean around the edges of your infected eye with cotton balls or gauze and warm water. This helps remove discharge and crusting.   It's possible to get conjunctivitis again after the initial symptoms have gone away. To avoid re-infection:    Throw away eye or face makeup, gauze, or cotton balls you used while your eyes were infected.    Clean any eyeglasses and cases that were used while infected.    Wash all towels, pillowcases, and linens separately and in hot water.   Prevention    Regularly wash your hands using soap and warm water. An alcohol-based handrub also works.    Wash your hands if you come into contact with an infected person. Wash your hands if you touch items an infected person has used.    Keep hands and fingers out of your eyes.    Don't share " items such as pillows, towels, eye drops, makeup, or eyeglasses.    If you begin to wear contact lenses, follow your provider's directions for cleaning, storing, and replacing them.    Consider waiting at least 24 hours after starting treatment before returning to group activities.   Your provider   Your diagnosis was provided by Jade Phillips, a member of your trusted care team at [facility].   If you have any questions, call us at [phone]  .   View Doctor's Note     Expires on 05/26/22

## 2022-04-26 NOTE — E-VISIT ESCALATED
Chief Complaint: Eye pain, eye bump, or irritation   Patient was shown the following escalation message:   Some conditions need a visit with a healthcare provider   We need to see your eyes to provide the best treatment. Since you'd rather not send photos, you should speak with a provider to get care.   ----------   Patient Interview Transcript:   Which of these symptoms are bothering you? Select all that apply.    Redness in the whites of the eye(s)    Eye drainage (such as excess tears, mucus, or pus)    Crusting of the eyelids or eyelashes    Eyelid swelling   Not selected:    Bump or pimple on or inside the eyelid    Eye dryness   Do you have any of these eye symptoms? Select all that apply.    Itchiness    Burning, liam, or gritty feeling    Eyelid pain    Pain inside the eyeball   Not selected:    Flaking or dry skin on the eyelid    None of the above   How long have you had your eye symptoms? Select one.    1 to 3 days   Not selected:    Less than 1 day    4 to 7 days    More than 7 days   Which eye is affected? Select one.    My left eye   Not selected:    My right eye    Both eyes    It started in one eye, but now both eyes are affected   Does it feel like there's something in your eye that's making it hard to open your eye or keep it open? Select one.    No   Not selected:    Yes   How would you describe your eyeball pain? Select one.    Mild   Not selected:    Moderate; it gets in the way of my daily activities    Severe; I can't open my eye(s)   How would you describe your eyelid pain? Select one.    Mild   Not selected:    Moderate; it gets in the way of my daily activities    Severe; I can't open my eye(s)   Is the skin around your eye red, swollen, or warm to the touch? Pay special attention to the area above your eyelid, the upper cheek, forehead, and the top part of the nose (between the eyes). Select one.    Yes   Not selected:    No   You mentioned that your eyelid is swollen. How would you  "describe it? Select one.    A little puffy, but I can easily open my eye   Not selected:    Swollen, and it's hard to open my eye    My eye is completely swollen shut   How would you describe your eye redness? Select one.    A. It looks like small blood vessels spread throughout the white part of the eye   Not selected:    B. It looks like small blood vessels, but the redness is much worse around the colored part of the eye    C. There's a spot of red in just one part of the white area    D. None of the above   What color is your eye drainage or crust? Select one.    Clear   Not selected:    White    Yellow    Green   How would you describe your eye drainage? Select one.    Thin (watery, like normal tears)   Not selected:    Thick    Stringy    None of the above   How would you describe your eyes when you wake up in the morning? Select one.    My eyelashes and eyelids are crusty and gooey, but they're not stuck shut   Not selected:    My eyes are \"stuck shut\"    None of the above   Is there pain or increased pain when you move your eye(s)? To test this, focus on an object in the distance with both eyes. Now look to the left, the right, above, and below that object without moving your head. Select one.    Yes   Not selected:    No   Compare your affected eye to your other eye. Does the affected eye seem to be bulging out more than the other eye? Select one.    No   Not selected:    Yes   In the past 3 months, have you been diagnosed with glaucoma? Select one.    No   Not selected:    Yes   Does your eye look cloudy, causing the colored part of the eye to appear dull?    No   Not selected:    Yes   Do you have any open sores or blisters around your eyes or on your eyelids, nose, scalp, or forehead? Select one.    No   Not selected:    Yes   Do you have a white or grayish sore on your eye?    No   Not selected:    Yes   Eye conditions may be more serious when certain factors are present. Have you had any vision " changes? Select one.    No   Not selected:    Yes, my vision is a little blurry, but it clears when I wipe my eyes    Yes, I'm seeing double    Yes, I can't see anything at all   Are you sensitive to bright lights? For example, do you need to shade your eyes by wearing a hat or sunglasses? Do you find yourself holding up your hand to block out light? Do you keep your head down and turned away from lights, lamps, or windows? Select one.    No   Not selected:    Yes   We need to see photos of your eye. Photos help us make a diagnosis and recommend the right treatment. If you choose not to send photos, you may need to speak with a provider to get care. Select one.    No, I'd rather not upload photos   Not selected:    OK, I'll upload photos from my computer   ----------   Medical history   Medical history data does not currently exist for this patient.

## 2022-04-26 NOTE — E-VISIT TREATED
Chief Complaint: Eye pain, eye bump, or irritation   Patient introduction   Patient is 46-year-old male who reports eye redness, eye drainage, eyelid crusting, and eyelid swelling in the left eye for 1-3 days.   Patient did not request an excuse note.   General presentation   Eye redness described as small blood vessels spread throughout the white part of the eye.   Eye drainage is thin and clear. When patient wakes up in the morning, eyes are crusty but not stuck shut.   Other symptoms include itchiness, a burning/liam/gritty feeling, eyelid pain, and eyeball pain. Reports redness, swelling, or warmth of skin around the eyes. Eyeball pain is mild. Eyelid pain is mild. Vision is slightly blurred.   Associated symptoms include:    Upper respiratory symptoms including stuffy nose.   Patient denies the following symptoms: - Fever    Foreign body sensation    Light sensitivity    Headache    Flaking or dry skin on eyelids   Patient does not wear contact lenses.   No known conjunctivitis exposure in the last 2 weeks. History of conjunctivitis in the past. Current symptoms feel exactly like previous episodes of conjunctivitis. No use of antibiotics to treat conjunctivitis within the last month.   Denies symptoms started shortly after a change in environment. History of seasonal and/or pet allergies. No history of developing similar eye symptoms at the same time of year. Denies current symptoms feel allergy-related.   Denies ever using eye drops for their eye allergy symptoms.   History of allergic rhinitis.   No history of blepharitis or seborrheic dermatitis.   Denies using non-prescription eye drops or eye wash for current symptoms.   Patient denies the following red flags:    Severe eyeball pain    Severe eyelid pain    Serious vision changes    Feeling as if something is in the eye or sensitivity to bright lights    Recent eye injury    Signs/symptoms suggesting angle-closure glaucoma (cloudiness or dullness of  the iris along with moderate to severe headache)    History of glaucoma in the past 3 months    Eye surgery in the past 3 months    Severe headache    Open sores or blisters on eyelids, nose, scalp, forehead, or around their eyes    White or grayish sore on the eye(s)    Moderate to severe eyelid swelling    Getting irritants in their eyes within the past week    Treatment for any eye conditions in past 4 weeks    Exposure to an STI during the 12 hours prior to symptom onset    Eye redness that looks like small blood vessels, with the redness worse around the iris   Self-exam: - Pain with eye movements - Bulging of the affected eye   Current medications   Reports taking Metformin, Amlodipine, Glyburide, benazepril, Escitalopram 20 MG [Lexapro], Clonidine, Simvastatin, and Furosemide 40 MG [Lasix].   Medication allergies   No known drug allergies.   Medication contraindication review   None.   Past medical history   Reports moderate to severe plaque psoriasis. Patient denies currently taking medications for their condition(s). Patient denies history of cancer.   Assessment   Bacterial conjunctivitis   This is the likely diagnosis based on supporting interview responses, including:    Unilateral symptoms   Plan   Medications:    polymyxin B sulfate 10,000 unit-trimethoprim 1 mg/mL eye drops RX 10,000 units/1mg/mL 1 gtt in affected eye qid 7d for infection. This medication is an antibiotic. Take it exactly as directed. You must complete the 7-day course of medication, even if you feel better after the first few days of treatment. Amount is 10 mL.   The patient's prescription will be sent to:   [facility] [address] [place] [address] [address] [facility] [id number]   Phone: [phone]     Fax: [phone]   Other:   Patient was given an excuse note for 1 day.   Education:    Condition and causes    Prevention    Treatment and self-care    When to call provider   Additional Notes   Injected left eye   ----------    Electronically signed by KAREN Rodriguez on 2022-04-26 at 16:02PM   ----------   Patient Interview Transcript:   Which of these symptoms are bothering you? Select all that apply.    Redness in the whites of the eye(s)    Eye drainage (such as excess tears, mucus, or pus)    Crusting of the eyelids or eyelashes    Eyelid swelling   Not selected:    Bump or pimple on or inside the eyelid    Eye dryness   Do you have any of these eye symptoms? Select all that apply.    Itchiness    Burning, liam, or gritty feeling    Eyelid pain    Pain inside the eyeball   Not selected:    Flaking or dry skin on the eyelid    None of the above   How long have you had your eye symptoms? Select one.    1 to 3 days   Not selected:    Less than 1 day    4 to 7 days    More than 7 days   Which eye is affected? Select one.    My left eye   Not selected:    My right eye    Both eyes    It started in one eye, but now both eyes are affected   Does it feel like there's something in your eye that's making it hard to open your eye or keep it open? Select one.    No   Not selected:    Yes   How would you describe your eyeball pain? Select one.    Mild   Not selected:    Moderate; it gets in the way of my daily activities    Severe; I can't open my eye(s)   How would you describe your eyelid pain? Select one.    Mild   Not selected:    Moderate; it gets in the way of my daily activities    Severe; I can't open my eye(s)   Is the skin around your eye red, swollen, or warm to the touch? Pay special attention to the area above your eyelid, the upper cheek, forehead, and the top part of the nose (between the eyes). Select one.    Yes   Not selected:    No   You mentioned that your eyelid is swollen. How would you describe it? Select one.    A little puffy, but I can easily open my eye   Not selected:    Swollen, and it's hard to open my eye    My eye is completely swollen shut   How would you describe your eye redness? Select one.    A. It  "looks like small blood vessels spread throughout the white part of the eye   Not selected:    B. It looks like small blood vessels, but the redness is much worse around the colored part of the eye    C. There's a spot of red in just one part of the white area    D. None of the above   What color is your eye drainage or crust? Select one.    Clear   Not selected:    White    Yellow    Green   How would you describe your eye drainage? Select one.    Thin (watery, like normal tears)   Not selected:    Thick    Stringy    None of the above   How would you describe your eyes when you wake up in the morning? Select one.    My eyelashes and eyelids are crusty and gooey, but they're not stuck shut   Not selected:    My eyes are \"stuck shut\"    None of the above   Is there pain or increased pain when you move your eye(s)? To test this, focus on an object in the distance with both eyes. Now look to the left, the right, above, and below that object without moving your head. Select one.    Yes   Not selected:    No   Compare your affected eye to your other eye. Does the affected eye seem to be bulging out more than the other eye? Select one.    Yes   Not selected:    No   In the past 3 months, have you been diagnosed with glaucoma? Select one.    No   Not selected:    Yes   Does your eye look cloudy, causing the colored part of the eye to appear dull?    No   Not selected:    Yes   Do you have any open sores or blisters around your eyes or on your eyelids, nose, scalp, or forehead? Select one.    No   Not selected:    Yes   Do you have a white or grayish sore on your eye?    No   Not selected:    Yes   Eye conditions may be more serious when certain factors are present. Have you had any vision changes? Select one.    Yes, my vision is a little blurry, but it clears when I wipe my eyes   Not selected:    Yes, I'm seeing double    Yes, I can't see anything at all    No   Are you sensitive to bright lights? For example, do you " need to shade your eyes by wearing a hat or sunglasses? Do you find yourself holding up your hand to block out light? Do you keep your head down and turned away from lights, lamps, or windows? Select one.    No   Not selected:    Yes   We need to see photos of your eye. Photos help us make a diagnosis and recommend the right treatment. If you choose not to send photos, you may need to speak with a provider to get care. Select one.    OK, I'll upload photos from my computer   Not selected:    No, I'd rather not upload photos   Send up to three photos for review. Remember: - Don't use a flash. - Take one close-up photo showing both eyes together. - Take a second close-up photo of the affected eye only. For this photo, pull down the lower lid and look upward.    Upload 1    Upload 2    Upload 3   Do your symptoms include a headache? Select one.    No   Not selected:    Yes, a mild headache    Yes, a moderate headache; it gets in the way of my daily activities    Yes, a severe headache; it stops me from doing my daily activities    Yes, the worst headache of my life   Along with your eye symptoms, have you had a fever or felt hot? Select one.    No   Not selected:    Yes   Do you currently have any of these symptoms? Select all that apply.    Stuffy nose   Not selected:    Cough    Runny nose    Sore throat    None of the above   In addition to your eye symptoms, have you developed any of these? Select all that apply.    None of the above   Not selected:    Muscle aches    Skin rash   Have you recently injured your eye(s)? Injuries include being scratched, hit, or poked in the eye. Select one.    No   Not selected:    Yes   In the past week, have you gotten any irritants in your eye(s)? Irritants include things such as chemicals and cleaning supplies. Select one.    No   Not selected:    Yes   Have you been around someone with pink eye (conjunctivitis) in the last 2 weeks? Select one.    No, not that I know of   Not  selected:    Yes   Do you have any seasonal or pet allergies? Select one.    Yes   Not selected:    No, not that I know of   Do your current symptoms feel like allergy symptoms? Select one.    No   Not selected:    Yes   Do you often develop similar symptoms during the same time or season of the year? Select one.    No   Not selected:    Yes   Did your symptoms begin shortly after a change in your environment? This might include moving to a new home, being exposed to a new pet, or traveling to a new place. Select one.    No   Not selected:    Yes   Some eye symptoms can be caused by a sexually transmitted infection (STI). Is it possible you were exposed to an STI during the 12 hours before your symptoms began? Select one.    No, not that I know of   Not selected:    Yes   Have you ever had pink eye in the past? Select one.    Yes   Not selected:    No   How much do your current symptoms feel like past cases of pink eye? Select one.    Exactly the same   Not selected:    Mostly the same    Somewhat the same    Completely different   Have you had eye surgery in the past 3 months? Select one.    No   Not selected:    Yes   Have you been seen or treated for any eye conditions in the past 4 weeks? Select one.    No   Not selected:    Yes   Do you wear contact lenses? Select one.    No   Not selected:    Yes   Do you have a history of any of these? Select all that apply.    Hay fever (allergic rhinitis)   Not selected:    Asthma    Eczema    None of the above   Do you have (or have you had in the past) any of these conditions? Select all that apply.    None of the above   Not selected:    Blepharitis (eyelid inflammation)    Seborrheic dermatitis (dandruff)   Have you recently been treated for any of these? Select all that apply.    None of the above   Not selected:    Aspirin- or NSAID-induced asthma or urticaria (hives)    Aspirin triad, Samter's triad, or aspirin-exacerbated respiratory disease (AERD)    Coronary  artery bypass graft (CABG) surgery    Fungal eye infection    Scratched cornea, or corneal abrasion    Tuberculosis infection of the eye    Viral eye infection   Do you have any of these conditions that can affect the immune system? Scroll to see all options. Select all that apply.    Moderate to severe plaque psoriasis   Not selected:    History of bone marrow transplant    Chronic kidney disease    Chronic liver disease (including cirrhosis)    HIV/AIDS    Inflammatory bowel disease (Crohn's disease or ulcerative colitis)    Lupus    Multiple sclerosis    Rheumatoid arthritis    Sickle cell anemia    Alpha or beta thalassemia    History of solid organ transplant (kidney, liver, or heart)    History of spleen removal    An autoimmune disorder not listed here    A condition requiring treatment with long-term use of oral steroids (such as prednisone, prednisolone, or dexamethasone)    None of these   Do you take medications for your condition? This includes oral and injectable medications that are taken daily, weekly, or monthly. Select one.    No   Not selected:    Yes, regularly    Yes, for flare-ups only   Have you ever been diagnosed with cancer? Select one.    No   Not selected:    Yes, I have cancer now    Yes, but I'm in remission   Have you used antibiotic eye drops for pink eye in the past month? Select one.    No   Not selected:    Yes   In the past, which eye drops worked well to control your eye allergy symptoms? Select all that apply.    I haven't used eye drops for my allergies   Not selected:    Nothing has worked    Olopatadine (Pataday, Patanol)    Alcaftadine (Lastacaft)    Bepotastine (Bepreve)    Cetirizine (Zerviate)    Epinastine    Ketotifen (Alaway, Zaditor)    Azelastine (Optivar)    Naphazoline/Pheniramine (Naphcon-A, Opcon-A)    Cromolyn sodium    Nedocromil (Alocril)    Lodoxamide (Alomide)    Other (specify)   Have you used any of these non-prescription eye drops or an eye wash for your  current symptoms? Select all that apply.    No   Not selected:    Eye drops for itchy eyes (such as Naphcon A, Opcon-A, or Visine-A)    Eye drops for red eyes (such as Advanced Eye Relief Redness, Clear Eyes Redness Relief)    Eye drops for dry eyes (such as Artificial Tears, Advanced Eye Relief Dry Eyes, Refresh Tears, or Systane Ultra)    Eye drops for allergies (such as Zaditor)    Eye wash    Other (specify)   Are you taking any other medications or supplements? On the next screen, you need to list all vitamins, supplements, non-prescription medications (such as aspirin or Aleve), and prescription medications that you're taking. Select one.    Yes   Not selected:    Yes, but I'm not sure what they are    No   Have you ever had an allergic or bad reaction to any medication? Select one.    No   Not selected:    Yes   Do you need a doctor's note? A doctor's note confirms that you received care today and states when you can return to school or work. It does not contain information about your diagnosis or treatment plan. Your provider will make the final decision on whether to give you a doctor's note. Doctor's notes CANNOT be backdated. Select one.    No   Not selected:    Today only (1 day)    Today and tomorrow (2 days)    3 days   Is there anything else you'd like to tell us about your symptoms?   The patient did not enter any additional information.   ----------   Medical history   Medical history data does not currently exist for this patient.

## 2022-04-28 ENCOUNTER — TELEPHONE (OUTPATIENT)
Dept: ENDOCRINOLOGY | Age: 47
End: 2022-04-28

## 2022-04-28 ENCOUNTER — HOSPITAL ENCOUNTER (OUTPATIENT)
Dept: CARDIOLOGY | Facility: HOSPITAL | Age: 47
Discharge: HOME | End: 2022-04-28
Admitting: NURSE PRACTITIONER

## 2022-04-28 ENCOUNTER — APPOINTMENT (OUTPATIENT)
Dept: CARDIOLOGY | Facility: HOSPITAL | Age: 47
End: 2022-04-28

## 2022-04-28 DIAGNOSIS — I15.9 SECONDARY HYPERTENSION: ICD-10-CM

## 2022-04-28 LAB
BH CV ECHO MEAS - DIST REN A EDV LEFT: 16.1 CM/S
BH CV ECHO MEAS - DIST REN A PSV LEFT: 78.2 CM/S
BH CV ECHO MEAS - MID REN A EDV LEFT: 14.6 CM/S
BH CV ECHO MEAS - MID REN A PSV LEFT: 84.3 CM/S
BH CV ECHO MEAS - PROX REN A EDV LEFT: 25 CM/S
BH CV ECHO MEAS - PROX REN A PSV LEFT: 110 CM/S
BH CV VAS BP LEFT ARM: NORMAL MMHG
BH CV VAS BP RIGHT ARM: NORMAL MMHG
BH CV VAS RENAL AORTIC MID PSV: 92 CM/S
BH CV VAS RENAL HILUM LEFT EDV: 6 CM/S
BH CV VAS RENAL HILUM LEFT PSV: 28 CM/S
BH CV VAS RENAL HILUM RIGHT EDV: 8 CM/S
BH CV VAS RENAL HILUM RIGHT PSV: 57 CM/S
BH CV XLRA MEAS - KID L LEFT: 13.1 CM
BH CV XLRA MEAS DIST REN A EDV RIGHT: 27.5 CM/S
BH CV XLRA MEAS DIST REN A PSV RIGHT: 146 CM/S
BH CV XLRA MEAS KID L RIGHT: 12.7 CM
BH CV XLRA MEAS KID W RIGHT: 5.8 CM
BH CV XLRA MEAS MID REN A EDV RIGHT: 18.6 CM/S
BH CV XLRA MEAS MID REN A PSV RIGHT: 107 CM/S
BH CV XLRA MEAS PROX REN A EDV RIGHT: 19.5 CM/S
BH CV XLRA MEAS PROX REN A PSV RIGHT: 115 CM/S
BH CV XLRA MEAS RENAL A ORG EDV LEFT: 20.8 CM/S
BH CV XLRA MEAS RENAL A ORG EDV RIGHT: 14.4 CM/S
BH CV XLRA MEAS RENAL A ORG PSV LEFT: 102 CM/S
BH CV XLRA MEAS RENAL A ORG PSV RIGHT: 80.5 CM/S
LEFT KIDNEY WIDTH: 6.8 CM
LEFT RENAL UPPER PARENCHYMA MAX: 17 CM/S
LEFT RENAL UPPER PARENCHYMA MIN: 5 CM/S
LEFT RENAL UPPER PARENCHYMA RI: 0.71
MAXIMAL PREDICTED HEART RATE: 173 BPM
RIGHT RENAL UPPER PARENCHYMA MAX: 22 CM/S
RIGHT RENAL UPPER PARENCHYMA MIN: 5 CM/S
RIGHT RENAL UPPER PARENCHYMA RI: 0.77
STRESS TARGET HR: 147 BPM

## 2022-04-28 PROCEDURE — 93975 VASCULAR STUDY: CPT

## 2022-04-29 ENCOUNTER — TELEPHONE (OUTPATIENT)
Dept: CARDIOLOGY | Facility: CLINIC | Age: 47
End: 2022-04-29

## 2022-04-29 NOTE — TELEPHONE ENCOUNTER
Notified patient of results. Patient verbalized understanding.    Manuela Acevedo RN  Triage [facility] [place]

## 2022-04-29 NOTE — TELEPHONE ENCOUNTER
----- Message from KAREN Day sent at 4/28/2022  1:05 PM EDT -----  Please inform patient he has normal renal arteries bilateral

## 2022-07-05 RX ORDER — FUROSEMIDE 40 MG/1
TABLET ORAL
Qty: 60 TABLET | Refills: 3 | Status: SHIPPED | OUTPATIENT
Start: 2022-07-05 | End: 2022-11-10 | Stop reason: SDUPTHER

## 2022-07-18 ENCOUNTER — OFFICE VISIT (OUTPATIENT)
Dept: ENDOCRINOLOGY | Age: 47
End: 2022-07-18

## 2022-07-18 VITALS
OXYGEN SATURATION: 96 % | WEIGHT: 315 LBS | SYSTOLIC BLOOD PRESSURE: 138 MMHG | HEART RATE: 53 BPM | HEIGHT: 75 IN | BODY MASS INDEX: 39.17 KG/M2 | TEMPERATURE: 98.4 F | DIASTOLIC BLOOD PRESSURE: 80 MMHG

## 2022-07-18 DIAGNOSIS — E66.01 TYPE 2 DIABETES MELLITUS WITH MORBID OBESITY: ICD-10-CM

## 2022-07-18 DIAGNOSIS — E78.5 HYPERLIPIDEMIA ASSOCIATED WITH TYPE 2 DIABETES MELLITUS: ICD-10-CM

## 2022-07-18 DIAGNOSIS — E11.69 HYPERLIPIDEMIA ASSOCIATED WITH TYPE 2 DIABETES MELLITUS: ICD-10-CM

## 2022-07-18 DIAGNOSIS — E11.69 TYPE 2 DIABETES MELLITUS WITH MORBID OBESITY: ICD-10-CM

## 2022-07-18 DIAGNOSIS — E11.65 TYPE 2 DIABETES MELLITUS WITH HYPERGLYCEMIA, WITHOUT LONG-TERM CURRENT USE OF INSULIN: Primary | ICD-10-CM

## 2022-07-18 PROCEDURE — 99214 OFFICE O/P EST MOD 30 MIN: CPT | Performed by: INTERNAL MEDICINE

## 2022-07-18 RX ORDER — GLIMEPIRIDE 4 MG/1
4 TABLET ORAL 2 TIMES DAILY WITH MEALS
Qty: 60 TABLET | Refills: 11 | Status: SHIPPED | OUTPATIENT
Start: 2022-07-18 | End: 2023-02-20 | Stop reason: SDUPTHER

## 2022-07-18 RX ORDER — ERYTHROMYCIN 5 MG/G
OINTMENT OPHTHALMIC
COMMUNITY
Start: 2022-06-27 | End: 2023-02-20

## 2022-07-18 RX ORDER — SEMAGLUTIDE 1.34 MG/ML
1 INJECTION, SOLUTION SUBCUTANEOUS WEEKLY
Qty: 3 ML | Refills: 3 | Status: SHIPPED | OUTPATIENT
Start: 2022-07-18 | End: 2022-12-05

## 2022-07-18 RX ORDER — DOXYCYCLINE 100 MG/1
TABLET ORAL
COMMUNITY
Start: 2022-07-11 | End: 2023-02-20

## 2022-07-18 NOTE — PROGRESS NOTES
"Chief Complaint  Chief Complaint   Patient presents with   • Diabetes     Type 2: Patient doesn't have meter with him today        Subjective          History of Present Illness    Gerry Thomas 46 y.o. presents with Type 2 dm as a F/u pt.     Type 2 dm - Diagnosed about  years ago.   Today in clinic pt reports being on glimepiride 4 mg po bid with meals, metformin 1000 mg po bid.   He reports running out of ozempic and foodpanda / hellofooduvia for about few months  Reports that BG are high  Checks BG - once a day  Dm retinopathy -yes  ,Last eye exam - few weeks back  Dm nephropathy - x  Dm neuropathy - yes ,Dm neuropathy meds -   CAD -x  CVA -x  Episodes of hypoglycemia - x  Pt is physically active. weight has been stable.   Pt tries to follow DM diet for most part.       Reviewed primary care physician's/consulting physician documentation and lab results         I have reviewed the patient's allergies, medicines, past medical hx, family hx and social hx in detail.    Objective   Vital Signs:   /80   Pulse 53   Temp 98.4 °F (36.9 °C) (Temporal)   Ht 190.5 cm (75\")   Wt (!) 179 kg (393 lb 12.8 oz)   SpO2 96%   BMI 49.22 kg/m²   Physical Exam   General appearance - no distress  Eyes- anicteric sclera  Ear nose and throat-external ears and nose normal.    Respiratory-normal chest on inspection.  No respiratory distress noted.  Skin-no rashes.  Neuro-alert and oriented x3            Result Review :   The following data was reviewed by: Dimitri Turner MD on 07/18/2022:  Hospital Outpatient Visit on 04/28/2022   Component Date Value Ref Range Status   • Target HR (85%) 04/28/2022 147  bpm Final   • Max. Pred. HR (100%) 04/28/2022 173  bpm Final   • Kid L 04/28/2022 13.1  cm Final   • PROX ANDREW A PSV LEFT 04/28/2022 110.0  cm/s Final   • PROX ANDREW A EDV LEFT 04/28/2022 25.0  cm/s Final   • MID ANDREW A PSV LEFT 04/28/2022 84.3  cm/s Final   • MID ANDREW A EDV LEFT 04/28/2022 14.6  cm/s Final   • DIST ANDREW A PSV LEFT 04/28/2022 " 78.2  cm/s Final   • DIST ANDREW A EDV LEFT 04/28/2022 16.1  cm/s Final   • BH CV VAS BP RIGHT ARM 04/28/2022 165/86  mmHg Final   • BH CV VAS BP LEFT ARM 04/28/2022 169/88  mmHg Final   • RENAL A ORG PSV LEFT 04/28/2022 102.0  cm/s Final   • RENAL A ORG EDV LEFT 04/28/2022 20.8  cm/s Final   • KID L RIGHT 04/28/2022 12.7  cm Final   • KID W RIGHT 04/28/2022 5.8  cm Final   • RENAL A ORG PSV RIGHT 04/28/2022 80.5  cm/s Final   • RENAL A ORG EDV RIGHT 04/28/2022 14.4  cm/s Final   • PROX ANDREW A PSV RIGHT 04/28/2022 115.0  cm/s Final   • PROX ANDREW A EDV RIGHT 04/28/2022 19.5  cm/s Final   • MID ANDREW A PSV RIGHT 04/28/2022 107.0  cm/s Final   • MID ANDREW A EDV RIGHT 04/28/2022 18.6  cm/s Final   • DIST ANDREW A PSV RIGHT 04/28/2022 146.0  cm/s Final   • DIST ANDREW A EDV RIGHT 04/28/2022 27.5  cm/s Final   • Left kidney width 04/28/2022 6.8  cm Final   • Right renal upper parenchyma max 04/28/2022 22  cm/s Final   • Right renal upper parenchyma min 04/28/2022 5  cm/s Final   • Left renal upper parenchyma max 04/28/2022 17  cm/s Final   • Left renal upper parenchyma min 04/28/2022 5  cm/s Final   • Aortic Mid PSV 04/28/2022 92  cm/s Final   • Hilum Right PSV 04/28/2022 57  cm/s Final   • Hilum Right EDV 04/28/2022 8  cm/s Final   • Hilum Left PSV 04/28/2022 28  cm/s Final   • Hilum Left EDV 04/28/2022 6  cm/s Final   • Right renal upper parenchyma RI 04/28/2022 0.77   Final   • Left renal upper parenchyma RI 04/28/2022 0.71   Final     Data reviewed: PCP notes       Results Review:    I reviewed the patient's new clinical results.     Assessment and Plan    Problem List Items Addressed This Visit        Other    Type 2 diabetes mellitus with hyperglycemia, without long-term current use of insulin (HCC) - Primary    Relevant Medications    glimepiride (AMARYL) 4 MG tablet    metFORMIN (GLUCOPHAGE) 1000 MG tablet    SITagliptin (JANUVIA) 100 MG tablet    Semaglutide, 1 MG/DOSE, (Ozempic, 1 MG/DOSE,) 4 MG/3ML solution pen-injector     "Other Relevant Orders    Hemoglobin A1c    Basic Metabolic Panel      Other Visit Diagnoses     Hyperlipidemia associated with type 2 diabetes mellitus (HCC)        Relevant Medications    glimepiride (AMARYL) 4 MG tablet    metFORMIN (GLUCOPHAGE) 1000 MG tablet    SITagliptin (JANUVIA) 100 MG tablet    Semaglutide, 1 MG/DOSE, (Ozempic, 1 MG/DOSE,) 4 MG/3ML solution pen-injector    Type 2 diabetes mellitus with morbid obesity (HCC)        Relevant Medications    glimepiride (AMARYL) 4 MG tablet    metFORMIN (GLUCOPHAGE) 1000 MG tablet    SITagliptin (JANUVIA) 100 MG tablet    Semaglutide, 1 MG/DOSE, (Ozempic, 1 MG/DOSE,) 4 MG/3ML solution pen-injector        Type 2 diabetes mellitus-uncontrolled with hyperglycemia  Patient is extremely noncompliant with his glycemic control  Resume Ozempic, Januvia  Continue metformin and glimepiride.  Based on the blood work-up will make further adjustments.    Hyperlipidemia  Continue the statin.    Emphasized the patient to be compliant with his medications.    Interpreted the blood work-up/imaging results performed by the primary care/consulting physician -    Refills sent to pharmacy    Follow Up     Patient was given instructions and counseling regarding her condition or for health maintenance advice. Please see specific information pulled into the AVS if appropriate.       Thank you for asking me to see your patient, Gerry Thomas in consultation.         Dimitri Turner MD  07/18/22      EMR Dragon / transcription disclaimer:     \"Dictated utilizing Dragon dictation\".         "

## 2022-07-19 LAB
BUN SERPL-MCNC: 23 MG/DL (ref 6–24)
BUN/CREAT SERPL: 19 (ref 9–20)
CALCIUM SERPL-MCNC: 9.1 MG/DL (ref 8.7–10.2)
CHLORIDE SERPL-SCNC: 98 MMOL/L (ref 96–106)
CO2 SERPL-SCNC: 27 MMOL/L (ref 20–29)
CREAT SERPL-MCNC: 1.23 MG/DL (ref 0.76–1.27)
EGFRCR SERPLBLD CKD-EPI 2021: 73 ML/MIN/1.73
GLUCOSE SERPL-MCNC: 219 MG/DL (ref 65–99)
HBA1C MFR BLD: 11.3 % (ref 4.8–5.6)
POTASSIUM SERPL-SCNC: 3.5 MMOL/L (ref 3.5–5.2)
SODIUM SERPL-SCNC: 140 MMOL/L (ref 134–144)

## 2022-07-19 NOTE — PROGRESS NOTES
HbA1c significantly worse since last time,continue glimepiride, metformin, Januvia, Ozempic.  If the A1c is not below 8% by next time might have to consider starting the patient on insulin.

## 2022-07-27 ENCOUNTER — LAB (OUTPATIENT)
Dept: LAB | Facility: HOSPITAL | Age: 47
End: 2022-07-27

## 2022-07-27 ENCOUNTER — OFFICE VISIT (OUTPATIENT)
Dept: INFECTIOUS DISEASES | Facility: CLINIC | Age: 47
End: 2022-07-27

## 2022-07-27 VITALS
HEIGHT: 75 IN | TEMPERATURE: 97.8 F | HEART RATE: 63 BPM | SYSTOLIC BLOOD PRESSURE: 148 MMHG | RESPIRATION RATE: 18 BRPM | DIASTOLIC BLOOD PRESSURE: 88 MMHG | WEIGHT: 315 LBS | BODY MASS INDEX: 39.17 KG/M2

## 2022-07-27 DIAGNOSIS — R50.9 FUO (FEVER OF UNKNOWN ORIGIN): Primary | ICD-10-CM

## 2022-07-27 LAB
CRP SERPL-MCNC: 1 MG/DL (ref 0–0.5)
ERYTHROCYTE [SEDIMENTATION RATE] IN BLOOD: 15 MM/HR (ref 0–15)

## 2022-07-27 PROCEDURE — 86140 C-REACTIVE PROTEIN: CPT | Performed by: INTERNAL MEDICINE

## 2022-07-27 PROCEDURE — 86235 NUCLEAR ANTIGEN ANTIBODY: CPT | Performed by: INTERNAL MEDICINE

## 2022-07-27 PROCEDURE — 99214 OFFICE O/P EST MOD 30 MIN: CPT | Performed by: INTERNAL MEDICINE

## 2022-07-27 PROCEDURE — 36415 COLL VENOUS BLD VENIPUNCTURE: CPT | Performed by: INTERNAL MEDICINE

## 2022-07-27 PROCEDURE — 86037 ANCA TITER EACH ANTIBODY: CPT | Performed by: INTERNAL MEDICINE

## 2022-07-27 PROCEDURE — 86200 CCP ANTIBODY: CPT | Performed by: INTERNAL MEDICINE

## 2022-07-27 PROCEDURE — 86225 DNA ANTIBODY NATIVE: CPT | Performed by: INTERNAL MEDICINE

## 2022-07-27 PROCEDURE — 85652 RBC SED RATE AUTOMATED: CPT | Performed by: INTERNAL MEDICINE

## 2022-07-28 ENCOUNTER — PATIENT ROUNDING (BHMG ONLY) (OUTPATIENT)
Dept: INFECTIOUS DISEASES | Facility: CLINIC | Age: 47
End: 2022-07-28

## 2022-07-28 LAB
C-ANCA TITR SER IF: NORMAL TITER
CENTROMERE B AB SER-ACNC: <0.2 AI (ref 0–0.9)
CHROMATIN AB SERPL-ACNC: <0.2 AI (ref 0–0.9)
DSDNA AB SER-ACNC: <1 IU/ML (ref 0–9)
ENA JO1 AB SER-ACNC: <0.2 AI (ref 0–0.9)
ENA RNP AB SER-ACNC: 3.3 AI (ref 0–0.9)
ENA SCL70 AB SER-ACNC: <0.2 AI (ref 0–0.9)
ENA SM AB SER-ACNC: <0.2 AI (ref 0–0.9)
ENA SS-A AB SER-ACNC: <0.2 AI (ref 0–0.9)
ENA SS-B AB SER-ACNC: <0.2 AI (ref 0–0.9)
Lab: ABNORMAL
P-ANCA ATYPICAL TITR SER IF: NORMAL TITER
P-ANCA TITR SER IF: NORMAL TITER

## 2022-07-28 NOTE — PROGRESS NOTES
July 28, 2022           We're always looking for ways to make our patients' experiences even better. Do you have recommendations on ways we may improve?  no    Overall were you satisfied with your first visit to our practice? yes

## 2022-08-02 LAB — CCP IGA+IGG SERPL IA-ACNC: 8 UNITS (ref 0–19)

## 2022-08-08 ENCOUNTER — DOCUMENTATION (OUTPATIENT)
Dept: INFECTIOUS DISEASES | Facility: CLINIC | Age: 47
End: 2022-08-08

## 2022-08-08 DIAGNOSIS — R76.8 ANTI-RNP ANTIBODIES PRESENT: ICD-10-CM

## 2022-08-08 DIAGNOSIS — R50.9 FUO (FEVER OF UNKNOWN ORIGIN): Primary | ICD-10-CM

## 2022-08-08 NOTE — PROGRESS NOTES
Patient with FUO with positive RNP antibodies.  Discussed with patient that this could be a sign of autoimmune disease but given lack of other typical signs and symptoms of autoimmune disease may also be false positive.  Still I think there is enough there that he should be evaluated by rheumatologist and have referred him to rheumatology.

## 2022-08-23 ENCOUNTER — OFFICE VISIT (OUTPATIENT)
Dept: CARDIOLOGY | Facility: CLINIC | Age: 47
End: 2022-08-23

## 2022-08-23 VITALS
OXYGEN SATURATION: 94 % | SYSTOLIC BLOOD PRESSURE: 138 MMHG | WEIGHT: 315 LBS | HEIGHT: 75 IN | BODY MASS INDEX: 39.17 KG/M2 | HEART RATE: 70 BPM | DIASTOLIC BLOOD PRESSURE: 80 MMHG

## 2022-08-23 DIAGNOSIS — I10 ESSENTIAL HYPERTENSION: ICD-10-CM

## 2022-08-23 DIAGNOSIS — E66.01 MORBID OBESITY WITH BMI OF 45.0-49.9, ADULT: ICD-10-CM

## 2022-08-23 DIAGNOSIS — I50.31 ACUTE DIASTOLIC CHF (CONGESTIVE HEART FAILURE): Primary | ICD-10-CM

## 2022-08-23 PROCEDURE — 99214 OFFICE O/P EST MOD 30 MIN: CPT | Performed by: INTERNAL MEDICINE

## 2022-08-23 RX ORDER — SPIRONOLACTONE 25 MG/1
25 TABLET ORAL DAILY
Qty: 30 TABLET | Refills: 11 | Status: SHIPPED | OUTPATIENT
Start: 2022-08-23

## 2022-08-23 NOTE — PROGRESS NOTES
[facility]      Patient Name: Gerry Thomas  :1975  Age: 47 y.o.  Encounter Provider:  Tramaine Alicea Jr, MD      Chief Complaint: Follow-up refractory hypertension      HPI  Gerry Thomas is a 47 y.o. male past medical history of morbid obesity, diabetes, hypertension and dyslipidemia who presents for follow-up evaluation of dyspnea on exertion and orthopnea.  He has had slow progression of exertional dyspnea over the past 2 years. He began to experience upper respiratory symptoms over the last week including congestion and cough.  He tested negative for Covid and was sent for a CTA chest with concern for pulmonary embolism.  No PE on the exam but evaluation of the lung parenchyma is interpreted as pulmonary edema with small bilateral effusions.  He admits to orthopnea and some mild chest discomfort when he lays down.  He states the discomfort feels like a sharp pain when taking deep inspiration.  He has no anginal symptoms.  He admits that his blood pressures been very poorly controlled over the past few years despite multiple medication changes made by Dr. Galloway.  He is not mindful of sodium or volume restriction.  Given the CT chest findings he was started on Lasix yesterday but he is uncertain of the dose. He has been unable to lose weight but admits that he has made no attempts at caloric restriction.  He is diagnosed with MONI and is compliant with CPAP.  He is a lifelong non-smoker who drinks socially and denies illicit drug use.  Last A1c was 8 which is good for him as it has been as high as 10 or 11.  He has no family history of premature coronary artery disease or sudden cardiac death.    Echo showed hypertensive heart disease.  No orthopnea, PND or edema but exertional symptoms.  Exacerbated by the fact that when we met he was weighing over 400 pounds.  He is lost 29 pounds since April.  He is compliant with CPAP but has not had his machine checked in over 2  "years.  No angina.  No palpitations, dizziness or syncope.  Social history was reviewed and is not pertinent to this clinic visit.    The following portions of the patient's history were reviewed and updated as appropriate: allergies, current medications, past family history, past medical history, past social history, past surgical history and problem list.      Review of Systems   Constitutional: Positive for malaise/fatigue. Negative for chills and fever.   HENT: Negative for hoarse voice and sore throat.    Eyes: Negative for double vision and photophobia.   Cardiovascular: Positive for chest pain, dyspnea on exertion, leg swelling and orthopnea. Negative for near-syncope, palpitations, paroxysmal nocturnal dyspnea and syncope.   Respiratory: Positive for cough and shortness of breath. Negative for wheezing.    Skin: Negative for poor wound healing and rash.   Musculoskeletal: Negative for arthritis and joint swelling.   Gastrointestinal: Negative for bloating, abdominal pain, hematemesis and hematochezia.   Neurological: Negative for dizziness and focal weakness.   Psychiatric/Behavioral: Negative for depression and suicidal ideas.       OBJECTIVE:   Vital Signs  Vitals:    08/23/22 1505   BP: 138/80   Pulse: 70   SpO2: 94%     Estimated body mass index is 46.95 kg/m² as calculated from the following:    Height as of this encounter: 190.5 cm (75\").    Weight as of this encounter: 170 kg (375 lb 9.6 oz).    Vitals reviewed.   Constitutional:       Appearance: Not in distress. Frail. Acutely ill-appearing.   Neck:      Comments: JVD is difficult to assess secondary to body habitus.  Pulmonary:      Effort: Pulmonary effort is normal.      Breath sounds: No wheezing. No rhonchi. No rales.      Comments: Decreased air entry bibasilar zones with no clear evidence for wheezes rhonchi or crackles  Chest:      Chest wall: Not tender to palpatation.   Cardiovascular:      PMI at left midclavicular line. Normal rate. " Regular rhythm. Normal S1. Normal S2.      Murmurs: There is no murmur.      No gallop. No click. No rub.   Pulses:     Intact distal pulses.   Edema:     Peripheral edema present.  Abdominal:      General: Bowel sounds are normal.      Palpations: Abdomen is soft.      Tenderness: There is no abdominal tenderness.   Musculoskeletal: Normal range of motion.         General: No tenderness. Skin:     General: Skin is warm and dry.   Neurological:      General: No focal deficit present.      Mental Status: Alert and oriented to person, place and time.         Procedures          ASSESSMENT:     Acute diastolic heart failure  Hypertensive heart disease with heart failure  Diabetes  Dyslipidemia  Morbid obesity  MONI on CPAP    PLAN OF CARE:     1. Chronic diastolic heart failure -he is doing much better since our first visit.  This is a combination of volume optimization, blood pressure control and weight loss.  He needs to continue losing weight through caloric restriction and increased activity.  Clonidine is probably not a good medication for him.  We will start spironolactone and have him wean clonidine to avoid rebound hypertension.  We will check labs in 1 week.  He will send me a blood pressure log after 2 weeks.  I will see him back in clinic in 6 months.  2. Hypertension -not well controlled at this time.  He will check a twice daily blood pressure log.  Strict sodium and volume restriction is counseled.  3. Diabetes  4. Dyslipidemia  5. Morbid obesity  6. MONI -currently on CPAP but has not had his machine checked in over 2 years.  I asked him to please make an appointment with his sleep doctor so that this can be checked and adjusted as necessary.    Return to clinic 6 months         Discharge Medications          Accurate as of August 23, 2022  3:06 PM. If you have any questions, ask your nurse or doctor.            Continue These Medications      Instructions Start Date   amLODIPine 10 MG tablet  Commonly  known as: NORVASC   10 mg, Oral, Daily      aspirin 81 MG chewable tablet   81 mg, Oral, Daily      benazepril 20 MG tablet  Commonly known as: LOTENSIN   20 mg, Oral, 2 Times Daily      cloNIDine 0.3 MG tablet  Commonly known as: CATAPRES   0.3 mg, Oral, 3 Times Daily      doxycycline 100 MG tablet  Commonly known as: ADOXA   No dose, route, or frequency recorded.      erythromycin 5 MG/GM ophthalmic ointment  Commonly known as: ROMYCIN   No dose, route, or frequency recorded.      escitalopram 20 MG tablet  Commonly known as: LEXAPRO   20 mg, Oral, Daily      furosemide 40 MG tablet  Commonly known as: LASIX   TAKE ONE TABLET BY MOUTH TWICE A DAY      glimepiride 4 MG tablet  Commonly known as: AMARYL   4 mg, Oral, 2 Times Daily With Meals      metFORMIN 1000 MG tablet  Commonly known as: GLUCOPHAGE   1,000 mg, Oral, 2 Times Daily With Meals      metoprolol tartrate 50 MG tablet  Commonly known as: LOPRESSOR   50 mg, Oral, 2 Times Daily      multivitamin with minerals tablet tablet   Daily      Ozempic (1 MG/DOSE) 4 MG/3ML solution pen-injector  Generic drug: Semaglutide (1 MG/DOSE)   1 mg, Subcutaneous, Weekly      simvastatin 20 MG tablet  Commonly known as: ZOCOR   20 mg, Oral, Nightly      SITagliptin 100 MG tablet  Commonly known as: JANUVIA   100 mg, Oral, Daily             Thank you for allowing me to participate in the care of your patient,      Sincerely,   Tramaine Alicea MD  [facility]  08/23/22  15:06 EDT

## 2022-09-15 RX ORDER — SITAGLIPTIN 100 MG/1
TABLET, FILM COATED ORAL
Qty: 30 TABLET | Refills: 0 | Status: SHIPPED | OUTPATIENT
Start: 2022-09-15 | End: 2023-02-20

## 2022-10-26 ENCOUNTER — OFFICE VISIT (OUTPATIENT)
Dept: INFECTIOUS DISEASES | Facility: CLINIC | Age: 47
End: 2022-10-26

## 2022-10-26 VITALS
HEIGHT: 75 IN | RESPIRATION RATE: 18 BRPM | TEMPERATURE: 97.6 F | HEART RATE: 60 BPM | BODY MASS INDEX: 39.17 KG/M2 | SYSTOLIC BLOOD PRESSURE: 170 MMHG | WEIGHT: 315 LBS | DIASTOLIC BLOOD PRESSURE: 92 MMHG

## 2022-10-26 DIAGNOSIS — R76.8 ANTI-RNP ANTIBODIES PRESENT: ICD-10-CM

## 2022-10-26 DIAGNOSIS — R50.9 FUO (FEVER OF UNKNOWN ORIGIN): Primary | ICD-10-CM

## 2022-10-26 PROCEDURE — 99214 OFFICE O/P EST MOD 30 MIN: CPT | Performed by: INTERNAL MEDICINE

## 2022-10-26 NOTE — PROGRESS NOTES
cc: Here for evaluation FUO    Per initial notes: patient reports that he was in his usual state of health till August 2020 when he developed 2 weeks of fever.  He was actually hospitalized here at [facility].  I saw him during that admission and there was a question of cellulitis but MRI was negative and we did not think that his toe was all that concerning.  We ended up treating him with empiric antibiotics since his fevers have resolved with antimicrobial therapy.  Since that time he has had intermittent fevers.  His typical pattern is that he gets 1 about 4-6 times a year which will develop the acute onset of fever and generalized malaise but no real focal symptoms.  The fevers lasted for about 24 to 48 hours and resolved spontaneously.  Again he denies any focal symptoms.  No family history of febrile diseases.  He is  and lives here in [place].  His only international travel to [place] is only animal exposure study.  He is on multiple blood pressure medicines and follows with cardiology.  He is also seeing endocrinology for diabetes and secondary hypertensive work-up is underway.    Seen in clinic in July and had extensive work-up checked with normal inflammatory markers, but did have positive RNP antibodies.  Was sent to Dr. Thakur of rheumatology and reviewed his evaluation.  Had extensive rheumatologic testing as noted below which was negative.  Dr. Thakur did not think this syndrome was compatible with rheumatologic illness.  Patient reports that he is had no further episodes of fever since his last appointment.  Actually thinks his last fever was in January.  He saw Dr. Martinez of cardiology and had a good report in terms of his heart failure and hypertension.  He has been weaned off carvedilol in favor of spironolactone.  Blood pressure is improved generally speaking although he forgot to take his medicines this morning.  Overall he has been feeling well.      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  .  Social drinker    Review of Systems: All other reviewed and negative except as per HPI    Blood pressure 170/92, pulse 60, temperature 97.6 °F (36.4 °C), temperature source Temporal, resp. rate 18, height 190.5 cm (75\"), weight (!) 172 kg (379 lb).  GENERAL: Awake and alert, in no acute distress.   HEENT: Oropharynx is clear. Hearing is grossly normal.   SKIN: Warm and dry without cutaneous eruptions   RRR  LCTAB  PSYCHIATRIC: Appropriate mood, affect, insight, and judgment.       DIAGNOSTICS:  Rheumatology labs reviewed: Antibeta-2 glycoprotein IgG and IgM negative, anticardiolipin IgG and IgM negative, anti-CENP IgG negative, anti-CCP negative, antidouble-stranded DNA negative, anti-Helen 1 IgG negative, antinuclear antibodies negative anti-RNA jose francisco III IgG negative, anti-R P 70 IgG negative, anti-Ro 52 IgG negative, anti-Ro 60 IgG negative, antiscleroderma 70 IgG negative, anti-Smith, anti-SSB, antithyroglobulin negative, anti-Helen 1 RNP IgG negative, lupus index negative, B C4 D level normal, he C4 D level normal, rheumatoid factor negative    Assessment and Plan  1.  Fever of unknown origin  2.  Diabetes mellitus type 2  3.  Hypertension, on amlodipine, metoprolol, spironolactone, benazepril and furosemide  4.  Positive RNP antibodies with negative rheumatologic work-up    Discussed with patient today.  Does not appear to have any rheumatologic source of fever.  Infectious work-up has been negative today.  His fevers have seemingly resolved.  We discussed pursuing additional testing but since he has had good resolution of his fevers we will hold off for now.  He is in a good progress in losing weight and controlling his blood pressure.    We could always reconsider screening for secondary " causes of blood pressure such as pheochromocytoma given he is on multiple blood pressure medicines, if he has any additional issues    Patient agreement with plan above.  I will not schedule him follow-up appointment, but he knows he can return to see me anytime he needs

## 2022-11-10 RX ORDER — FUROSEMIDE 40 MG/1
40 TABLET ORAL 2 TIMES DAILY
Qty: 60 TABLET | Refills: 3 | Status: SHIPPED | OUTPATIENT
Start: 2022-11-10

## 2022-11-11 ENCOUNTER — TELEPHONE (OUTPATIENT)
Dept: ENDOCRINOLOGY | Age: 47
End: 2022-11-11

## 2022-11-11 DIAGNOSIS — E11.65 TYPE 2 DIABETES MELLITUS WITH HYPERGLYCEMIA, WITHOUT LONG-TERM CURRENT USE OF INSULIN: Primary | ICD-10-CM

## 2022-11-17 ENCOUNTER — DOCUMENTATION (OUTPATIENT)
Dept: ENDOCRINOLOGY | Age: 47
End: 2022-11-17

## 2022-11-17 NOTE — PROGRESS NOTES
Benefits Investigation Summary    Prescription: Renewal     Dispensing pharmacy: [facility]    Copay amount: ozempic 11/29    [facility]  BIN: [id number]  PCN: [id number]  RX GROUP: [id number]    Prior Auth and Med Assistance notes:

## 2022-12-05 RX ORDER — SEMAGLUTIDE 1.34 MG/ML
INJECTION, SOLUTION SUBCUTANEOUS
Qty: 3 ML | Refills: 3 | Status: SHIPPED | OUTPATIENT
Start: 2022-12-05 | End: 2023-02-20 | Stop reason: SDUPTHER

## 2022-12-16 RX ORDER — BENAZEPRIL HYDROCHLORIDE 20 MG/1
TABLET ORAL
Qty: 180 TABLET | Refills: 2 | Status: SHIPPED | OUTPATIENT
Start: 2022-12-16

## 2023-02-06 ENCOUNTER — E-VISIT (OUTPATIENT)
Dept: FAMILY MEDICINE CLINIC | Facility: TELEHEALTH | Age: 48
End: 2023-02-06
Payer: COMMERCIAL

## 2023-02-06 PROCEDURE — BRIGHTMDVISIT: Performed by: NURSE PRACTITIONER

## 2023-02-06 NOTE — EXTERNAL PATIENT INSTRUCTIONS
Note   Kale Liang, I have placed a referral for behavioral health, and they will be able to help you figure out the best regimen of medications to be on. This can be a virtual visit through your [facility]. They will be in touch to set up a visit. I am also sending in a refill of lexapro 10mg daily. I will send in an additional prescription of hydroxyzine to take if you feel a panic attack coming on. You will need to make the appointment with behavioral health or an appointment with your primary care provider for future refills. I hope you get to feeling like yourself again! -Jade   Diagnosis   Anxiety (Generalized anxiety disorder)   My name is Jade Phillips. [facility] provider [facility] From what you've reported in your interview, you have symptoms of generalized anxiety disorder. I'm glad you reached out.    Anxiety is a common mental health condition.    Treatment for anxiety can include counseling, coaching, consultations, medications, or various digital tools. Many of the medicines used to treat depression are also used to treat anxiety. In creating your treatment plan, I've considered your symptoms, current situation, medical history, and previous treatments, if any.    Please follow up with your provider in a few weeks. They'll check how you're doing and adjust your treatment plan if necessary.    In addition to your prescribed treatment, there are things you can do to help yourself feel better. With anxiety, even small actions and lifestyle changes can make a big difference. Try some of the suggestions in the Other treatment section below.   Medications   Your pharmacy   [facility] [id number] [address] [place] [phone]     Prescription   Hydroxyzine pamoate (25mg): Take 1 capsule by mouth up to 4 times a day as needed for mood disorder. May cause drowsiness. Avoid driving a car or operating dangerous machinery while taking this medication. You have 2  refills for this prescription. Common brand names include Vistaril.   Escitalopram (10mg): Take 1 tablet by mouth once a day for mood disorder. You have 2 refills for this prescription. Common brand names include Lexapro.   Orders and referrals   I've included a referral for therapy in your treatment plan. Someone will contact you to schedule an appointment for counseling or therapy.   About your diagnosis   Feeling anxious or nervous every once in a while is normal. It becomes a mental health condition when you're very anxious, worried, restless, or irritable on most days for 6 months or longer. This type of anxiety affects your ability to perform daily tasks and can cause trouble with memory, concentration, and sleep. You may also experience physical symptoms like muscle tension, chest pain, stomach aches, and fatigue.   Fortunately, effective treatments for generalized anxiety disorder are available.   What to expect   Medications that help with anxiety take time to work. Many people start to feel better within 2 weeks. But others may not notice improvement until after 4 weeks of treatment. After that, it can take 6 to 12 weeks before the medications have their full effect.   Common side effects of these medications include:    Dry mouth    Dizziness    Drowsiness    Jitteriness    Nausea, vomiting, or diarrhea    Sexual problems    Weight gain   Many of these side effects are temporary and will fade after a few days or weeks of use. If any of the side effects are very bothersome or uncomfortable, please let us know. We may be able to change the dose or switch to a different medication. Finding the right medication and the right dose often takes some trial and error.   However, medication should not make your symptoms worse. Contact us immediately if your symptoms get worse or if you notice any of the symptoms in the When to seek care section below.   Remember to never stop an anti-anxiety medication without  first talking to a healthcare provider. Suddenly stopping this type of medication can cause withdrawal symptoms.   Counseling and talk therapy   Counseling or therapy teaches you new coping skills and more adaptive ways of thinking about problems. These tools can help you make positive changes. The benefits of counseling often last long after treatment sessions have stopped.   When to seek care   If you feel like harming yourself or others, call 911 right away.   The [facility] Suicide and Crisis Lifeline is also available. You can call [phone]   to speak with a counselor at the lifeline, or you can connect with one using their online chat  .   Call us at [phone]   with any sudden or unexpected symptoms.    Worsening anxiety symptoms    New or worsening depression symptoms    Feeling extremely agitated or restless    Panic attacks    Worsening insomnia    New or worsening irritability    Inappropriate aggression, anger, or violence    Dangerous impulses    Extreme increase in activity or talking    Other unusual changes in behavior, mood, thoughts, or feeling    Uncomfortable side effects of new medications    No improvement at all after 2 to 4 weeks on the new medication   Other treatment   The tips below may help you feel better while you start your treatment plan:   Self-care    Anxiety can lead you to avoid doing things. Taking even small actions can break the cycle of avoidance. This helps reduce the impact of your symptoms.    Start where you are and set small goals.    Be kind to yourself. Don't get down on yourself if you don't reach a goal. Be willing to try again.    Try to eat on a regular schedule. Blood sugar levels can affect mood.   Exercise    Physical exercise has an especially positive effect on anxiety. If you're able to exercise, try walking 30 minutes a day, 3 to 5 times a week. If that sounds like too much, challenge yourself to start walking for just 10 minutes a day. If walking is not for you,  "find another activity. Any kind of physical activity helps. The best exercise is the kind you enjoy and will actually do.   Improve your sleep   Getting better sleep is one of the best things you can do to improve your symptoms.    Caffeine, tobacco, and alcohol can cause interrupted sleep. Cutting down or quitting these can improve the quality of your sleep. If you can't quit caffeine completely, try avoiding it later in the day.    Set a regular bedtime, and allow a period of time to \"unwind\" before going to sleep.    Wake up at the same time every day.    Turn off or put away all electronic devices an hour before going to sleep.    Avoid reading, watching TV, or using electronic devices in bed.    As much as possible, keep your bedroom dark, cool, and quiet.    If you're struggling to sleep, don't stay in bed. Get up and go to a quiet spot. Read or do relaxation exercises. Then go back to bed and try again.   Try mindfulness exercises    If your mind races, focus on your body instead. Breathe in slowly through your nose and out through your mouth.    Some people find that meditation helps with anxiety. If you want to try meditation but don't know how, mobile apps can get you started.   Use your creativity    When you have anxiety, you can often spend too much time thinking. Making something with your hands can use your thoughts in a positive way and bring some relief. Activities like writing in a journal, gardening, woodworking, cooking, or doing a craft can help focus your mind.   Connect with others    If you can't meet in person, send a short text or email to someone just to keep in touch.    If you use social media, notice how it makes you feel. If certain topics or people have a negative effect on your mood, unfollow them. Limit the time you spend on social media. Active participation can be better than passive scrolling through a feed.    Try volunteering or just doing something kind for someone else. This " can lift your mood as well as theirs.   Your provider   Your diagnosis was provided by Jade Phillips, a member of your trusted care team at [facility].   If you have any questions, call us at [phone]  .

## 2023-02-06 NOTE — E-VISIT TREATED
Chief Complaint: Anxiety, Depression, Stress   Patient introduction   Patient is 47-year-old male presenting with mood symptoms. Patient has had current symptoms for less than a year. Patient is willing to try medication as part of their treatment plan.   Patient-submitted comments explaining reason for visit: I took Lexapro for almost 20 years. It did well in almost eliminating my panic attacks. I decided to stop taking it a month ago. Over the last few weeks, I have had a return of anxiety symptoms that were the reason I started taking it in the first place back when I was in college. Panic attacks, heart racing, general anxiety. I feel that I am angrier and have a quicker temper..   Patient did not request an excuse note.   Has had recent unusual stress relating to work and current news and events.   Depression screening   PHQ-9. Response options are: Not at all (0), On several days (1), More than half the days (2), or Nearly every day (3).   Over the past 2 weeks, patient has been bothered:    (2) On more than half the days by having little interest or pleasure in doing things    (1) On several days by depressed mood    (0) Not at all by sleep disturbance    (1) On several days by fatigue or lethargy    (1) On several days by change in appetite    (0) Not at all by feelings of worthlessness or excessive guilt    (1) On several days by poor concentration    (0) Not at all by observable restlessness or slowness in movement    (0) Not at all by thoughts of hurting themselves or that they would be better off dead   The above problems have made it somewhat difficult to work, function at home, or get along with other people.   Score: 6. Interpretation: 0 to 4: None to minimal. 5 to 9: Mild depression. 10 to 14: Major Depressive Disorder, Mild. 15 to 19: Major Depressive Disorder, Moderately Severe. 20 to 27: Major Depressive Disorder, Severe.   Anxiety screening   LISSETTE-7. Response options are: Not at all (0), On  several days (1), More than half the days (2), or Nearly every day (3)   Over the past 2 weeks, patient has been bothered:    (3) Nearly every day by feeling nervous, anxious, or on edge    (2) On more than half the days by not being able to stop or control worrying    (2) On more than half the days by worrying too much about different things    (2) On more than half the days by having trouble relaxing    (2) On more than half the days by being so restless that it is hard to sit still    (3) Nearly every day by becoming easily annoyed or irritable    (2) On more than half the days by feeling afraid, as if something awful might happen   The above problems have made it somewhat difficult to work, function at home, or get along with other people.   Score: 16. Interpretation: 0 to 4: None to minimal. 5 to 9: Mild anxiety. 10 to 14: Moderate anxiety. 15 to 21: Severe anxiety.   Suicide risk screening   Score: Negative screen (based on PHQ-9 responses above).   Action taken based on risk:    Negative screen: Patient completed interview.    Low risk: Patient completed interview. Follow-up per provider discretion.    Moderate risk: Recommended to call [phone] or [phone] or to go to their nearest ER. Patient given option to continue with the interview if those options are not relevant at this time. Follow-up per provider discretion.    High risk: Interview terminated. Recommended to go to ER or to call [phone] or [phone].   Repetitive thoughts and behaviors screening   DSM-5 Level 1 Cross-Cutting Symptom Measure, Section X. 2 items. Response options are: Not at all (0), Rarely (1), Several days (2), More than half the days (3), or Nearly every day (4)   Over the past 2 weeks, patient has been bothered:    (1) On 1 to 2 days by unpleasant thoughts, urges, or images that repeatedly enter their mind    (0) Not at all by feeling driven to repeat certain behaviors or mental acts   Score: 1. Interpretation: 0 to 2 (with 0 to 1 on both items):  Negative screen. 2 or higher (with 2 or higher on either item): Positive screen.   Veronica/hypomania screening   DSM-5 Level 1 Cross-Cutting Symptom Measure, Section III. 2 items. Response options are: Not at all (0), Rarely (1), Several days (2), More than half the days (3), or Nearly every day (4)   Over the past 2 weeks, patient has been bothered:    (1) On 1 to 2 days by sleeping less than usual, but still having a lot of energy    (0) Not at all by starting lots more projects than usual or doing more risky things than usual   Score: 1. Interpretation: 0 to 2 (with 1 on both items): Negative screen. 2 or higher (with 2 or higher on at least 1 item): Positive screen; in-interview follow-up with Juju Self-Rating Veronica (ASRM) Scale.   Psychosis/hallucination screening   DSM-5 Level 1 Cross-Cutting Symptom Measure, Section VII. 2 items. Response options are: Not at all (0), Rarely (1), Several days (2), More than half the days (3), or Nearly every day (4)   Over the past 2 weeks, patient has been bothered:    (0) Not at all by hearing things other people could not hear    (0) Not at all by feeling that someone could hear their thoughts   Score: 0. Interpretation: 0: Negative screen. 1 or higher: Positive screen.   Substance abuse screening   DSM-5 Level 1 Cross-Cutting Symptom Measure, Section XIII. 2 items on use of tobacco, recreational drugs, or prescription medications beyond the amount prescribed or duration of prescription.   Over the past 2 weeks, patient:    (0) Did not use tobacco    (1) Used a recreational or prescription drug on their own on 1 to 2 days   Score: 1. Interpretation: 0 is a negative screen. 1 or higher with positive response for prescription/recreational drug abuse leads to follow-up with Level 2 Cross-Cutting Symptom Measure, Section XIII. 1 or higher with positive response for tobacco use leads to tobacco cessation advice in AVS.   DSM-5 Level 2 Cross-Cutting Symptom Measure, Section XIII.  1 item per positive response to substance use on Level 1 screening above.   Over the past 2 weeks, patient:    (1) Used marijuana on 1 to 2 days   Score: 1. Interpretation: Scores on the individual items should be interpreted independently. Positive responses on multiple items indicates greater severity and complexity of substance use.   AUDIT-C. 3 items, shown if alcohol use reported above.   During the past year, patient:    (1) Had an alcoholic drink monthly or less    (0) Had 1 to 2 alcoholic drinks on a typical day when they were drinking    (0) Did not have 6 or more drinks on one occasion   Score: 1. Interpretation: A score of 4 or higher in men is a positive screen for unhealthy drinking.   Comorbid/Exacerbating conditions   History of diabetes and hypertension.   Past mental health history   Previous diagnosis of generalized anxiety disorder and panic attacks.   Family history of mental health disorders   Family history of generalized anxiety disorder and panic attacks.   Current mental health treatment   Patient is not currently taking medication for any mental health condition. Patient is not currently in counseling or therapy. Patient is not currently being seen by a psychiatrist and has not been seen by one in the last 2 years.   Previous mental health treatment   Has taken escitalopram in the past.   As to effectiveness of past treatment:   Patient was satisfied with escitalopram. Patient wants to refill escitalopram.   Current medications   Currently taking glimepiride 4 MG tablet, spironolactone 25 MG tablet, multivitamin with minerals tablet tablet, aspirin 81 MG chewable tablet, furosemide 40 MG tablet, amLODIPine 10 MG tablet, metoprolol tartrate 50 MG tablet, Ozempic (1 MG/DOSE) 4 MG/3ML solution pen-injector, benazepril 20 MG tablet, metFORMIN 1000 MG tablet, and simvastatin 20 MG tablet.   Medication allergies   None.   Medication contraindications   Assessment   Generalized anxiety disorder.    This diagnosis is based on review of patient interview responses and other available clinical information.    PHQ-9 depression screening score: 6. Interpretation: 5 to 9: Mild depression.    LISSETTE-7 generalized anxiety screening score: 16. Interpretation: 15 to 21: Severe anxiety.   Suicide risk severity screening was negative.   Screening results show low likelihood of vandana/hypomania and psychosis.   Based on screening tests, the following areas warrant further evaluation at follow-up:    Substance use   Plan   Medications:    hydroxyzine pamoate 25 mg capsule RX 25mg 1 cap PO qid PRN 30d for mood disorder. Amount is 60 cap. Refill amount is 2.    escitalopram 10 mg tablet RX 10mg 1 tab PO qd 30d for mood disorder. Amount is 30 tab. Refill amount is 2.   The patient's prescriptions will be sent to:   [facility] [id number]   [address] [place] [address]   Phone: [phone]     Fax: [phone]   Orders:    Referral to behavioral health. Additional note: Medication Required. Refer to [facility] for [facility]   Education:    Condition and causes    Treatment and self-care    Possible medication side effects    When to call provider   ----------   Electronically signed by KAREN Rodriguez on 2023-02-06 at 12:28PM   ----------   Patient Interview Transcript:   Have you ever been diagnosed with any of these mental health conditions? Select all that apply.    Generalized anxiety disorder (LISSETTE)    Panic attacks   Not selected:    Depression    Post traumatic stress disorder (PTSD)    Obsessive-compulsive disorder (OCD)    Bipolar disorder    Schizophrenia or schizoaffective disorder    A mental health condition not listed here (specify)    None of the above   Are you currently taking medication for any mental health condition? Select one.    No   Not selected:    Yes   Have you taken medication for any mental health condition in the past? Select one.    Yes   Not  selected:    No   Which medications have you taken in the past for your mental health condition(s)? Select all that apply.    Lexapro (escitalopram)   Not selected:    Atarax or Vistaril (hydroxyzine)    BuSpar (buspirone)    Celexa (citalopram)    Cymbalta or Drizalma Sprinkle (duloxetine)    Effexor or Effexor XR (venlafaxine)    Paxil, Paxil CR, or Pexeva (paroxetine)    Pristiq (desvenlafaxine)    Prozac (fluoxetine)    Remeron (mirtazapine)    Trazodone    Wellbutrin SR, Wellbutrin XL, Forfivo XL, or Aplenzin (bupropion)    Zoloft (sertraline)    Other (specify medication and whether you were satisfied with it)   I'm satisfied with Lexapro (escitalopram)    Yes   Not selected:    No   I want to refill/restart    Yes   Not selected:    No   Have you recently experienced unusual stress from any of these? Select all that apply.    Work    Current news and events   Not selected:    Personal relationships    Home situation    Family    Finances    Something related to COVID-19    None of the above   Are you currently in counseling or therapy? Select one.    No   Not selected:    Yes   Are you currently being seen by a psychiatrist, or have you been seen by a psychiatrist in the last 2 years? Select one.    No   Not selected:    Yes, currently    Yes, within the last 2 years   Do you have any of these medical conditions? Scroll to see all options. Select all that apply.    Diabetes    High blood pressure   Not selected:    Asthma    Cancer    Chronic pain    Congestive heart failure    Coronary artery disease (blocked arteries in the heart)    Epilepsy    Inflammatory arthritis    Kidney disease or history of kidney function problems    Lupus (SLE)    Multiple sclerosis    Parkinson disease    Thyroid disorder    Viral hepatitis    None of the above   Has anyone in your family had any of these? Select all that apply.    Generalized anxiety disorder (LISSETTE)    Panic attacks   Not selected:    Depression    Post  traumatic stress disorder (PTSD)    Obsessive-compulsive disorder (OCD)    Bipolar disorder    Schizophrenia or schizoaffective disorder    Drug or alcohol addiction (substance use disorder)     by suicide    Attempted suicide    No, not that I know of   1. Over the past 2 weeks, how often have you been bothered by: Having little interest or pleasure in doing things Select one.    More than half the days   Not selected:    Not at all    Several days    Nearly every day   2. Over the past 2 weeks, how often have you been bothered by: Feeling down, depressed, or hopeless Select one.    Several days   Not selected:    Not at all    More than half the days    Nearly every day   3. Over the past 2 weeks, how often have you been bothered by: Trouble falling or staying asleep, or sleeping too much Select one.    Not at all   Not selected:    Several days    More than half the days    Nearly every day   4. Over the past 2 weeks, how often have you been bothered by: Feeling tired or having little energy Select one.    Several days   Not selected:    Not at all    More than half the days    Nearly every day   5. Over the past 2 weeks, how often have you been bothered by: Poor appetite or overeating Select one.    Several days   Not selected:    Not at all    More than half the days    Nearly every day   6. Over the past 2 weeks, how often have you been bothered by: Feeling bad about yourself, that you're a failure, or that you've let yourself or friends and family down Select one.    Not at all   Not selected:    Several days    More than half the days    Nearly every day   7. Over the past 2 weeks, how often have you been bothered by: Trouble concentrating on things like watching TV or reading the news Select one.    Several days   Not selected:    Not at all    More than half the days    Nearly every day   8. Over the past 2 weeks, how often have you been bothered by: Moving or speaking so slowly that other people could  have noticed OR Being so fidgety or restless that you have been moving around a lot more than usual Select one.    Not at all   Not selected:    Several days    More than half the days    Nearly every day   9. Over the past 2 weeks, how often have you been bothered by: Thoughts that you'd be better off dead or thoughts of hurting yourself Select one.    Not at all   Not selected:    Several days    More than half the days    Nearly every day   How difficult have these problems made it for you to work, take care of things at home, or get along with other people? Select one.    Somewhat difficult   Not selected:    Not difficult at all    Very difficult    Extremely difficult   1. Over the past 2 weeks, how often have you been bothered by: Feeling nervous, anxious, or on edge? Select one.    Nearly every day   Not selected:    Not at all    Several days    More than half the days   2. Over the past 2 weeks, how often have you been bothered by: Not being able to stop or control worrying? Select one.    More than half the days   Not selected:    Not at all    Several days    Nearly every day   3. Over the past 2 weeks, how often have you been bothered by: Worrying too much about different things? Select one.    More than half the days   Not selected:    Not at all    Several days    Nearly every day   4. Over the past 2 weeks, how often have you been bothered by: Having trouble relaxing? Select one.    More than half the days   Not selected:    Not at all    Several days    Nearly every day   5. Over the past 2 weeks, how often have you been bothered by: Being so restless that it's hard to sit still? Select one.    More than half the days   Not selected:    Not at all    Several days    Nearly every day   6. Over the past 2 weeks, how often have you been bothered by: Becoming easily annoyed or irritable? Select one.    Nearly every day   Not selected:    Not at all    Several days    More than half the days   7. Over  the past 2 weeks, how often have you been bothered by: Feeling afraid, as if something awful might happen? Select one.    More than half the days   Not selected:    Not at all    Several days    Nearly every day   How difficult have these symptoms made it for you to do your work, take care of things at home, or get along with other people? Select one.    Somewhat difficult   Not selected:    Not difficult at all    Very difficult    Extremely difficult   Over the past 2 weeks, how often have you been bothered by: Sleeping less than usual, but still having a lot of energy? Select one.    1 to 2 days   Not selected:    Not at all    Several days    More than half the days    Nearly every day   Over the past 2 weeks, how often have you been bothered by: Starting lots more projects than usual or doing more risky things than usual? Select one.    Not at all   Not selected:    1 to 2 days    Several days    More than half the days    Nearly every day   Over the past 2 weeks, how often have you been bothered by: Hearing things other people couldn't hear, such as voices even when no one was around? Select one.    Not at all   Not selected:    1 to 2 days    Several days    More than half the days    Nearly every day   Over the past 2 weeks, how often have you been bothered by: Feeling that someone could hear your thoughts, or that you could hear what another person was thinking? Select one.    Not at all   Not selected:    1 to 2 days    Several days    More than half the days    Nearly every day   Over the past 2 weeks, how often have you been bothered by: Unpleasant thoughts, urges, or images that repeatedly enter your mind? Select one.    1 to 2 days   Not selected:    Not at all    Several days    More than half the days    Nearly every day   Over the past 2 weeks, how often have you been bothered by: Feeling driven to perform certain behaviors or mental acts over and over again? Select one.    Not at all   Not  "selected:    1 to 2 days    Several days    More than half the days    Nearly every day   In the past year, how often did you have a drink containing alcohol? Select one.    Monthly or less   Not selected:    Never    2 to 4 times per month    2 to 3 times per week    4 or more times per week   In the past year, how many drinks did you have on a typical day when you were drinking? Select one.    1 or 2   Not selected:    3 or 4    5 or 6    7 to 9    10 or more   In the past year, how often did you have 6 or more drinks on one occasion? Select one.    Never   Not selected:    Less than monthly    Monthly    Weekly    Daily or almost daily   Over the past 2 weeks, how often have you: Smoked any cigarettes, smoked a cigar or pipe, or used snuff or chewing tobacco? Select one.    Not at all   Not selected:    1 to 2 days    Several days    More than half the days    Nearly every day   Over the past 2 weeks, how often did you use any of these medicines on your own? \"On your own\" means without a doctor's prescription, or more than prescribed, or longer than prescribed. - Prescription painkillers, such as Vicodin - Stimulants, such as Ritalin or Adderall - Sedatives or tranquilizers, such as sleeping pills or Valium - Marijuana - Cocaine or crack - Club drugs, such as Ecstasy - Hallucinogens, such as LSD - Heroin - Inhalants or solvents, such as glue - Methamphetamines, such as speed Select one.    1 to 2 days   Not selected:    Not at all    Several days    More than half the days    Nearly every day   Over the past 2 weeks, which of these medicines did you use on your own? Select all that apply.    Marijuana   Not selected:    Prescription painkillers, such as Vicodin    Stimulants, such as Ritalin or Adderall    Sedatives or tranquilizers, such as sleeping pills or Valium    Cocaine or crack    Club drugs, such as Ecstasy    Hallucinogens, such as LSD    Heroin    Inhalants or solvents, such as glue   " " Methamphetamines, such as speed   Over the past 2 weeks, how often did you use marijuana? Select one.    1 to 2 days   Not selected:    Several days    More than half the days    Nearly every day   Think about all of the symptoms you've shared with us today. How long have you been feeling this way? Select one.    Less than a year   Not selected:    More than a year    I'm not sure   These last few questions help us make sure your treatment plan is safe for you. Do you have any of these conditions? Select all that apply.    None of these   Not selected:    Uncorrected or persistent electrolyte abnormalities, such as potassium, sodium, calcium or magnesium    QT prolongation    Congenital long QT syndrome (LQTS)    Ventricular arrhythmias, such as ventricular fibrillation or ventricular tachycardia    Bradycardia (low heart rate)    Recent heart attack    Congestive heart failure (CHF)   Do any of these apply to you now or in the recent past? \"Cold turkey\" here means stopping a medication suddenly rather than slowly taking lower and lower doses until you're off the medication. Select all that apply.    None of these   Not selected:    Seizure disorder    Bulimia or anorexia    Liver disease    Alcohol abuse    Stopped using alcohol \"cold turkey\"    Stopped using a sedative \"cold turkey\"    Stopped using an anti-seizure drug \"cold turkey\"    Stopped using a benzodiazepine drug (Klonopin, Valium, Ativan, Xanax) \"cold turkey\"   Do any of the following apply to you? Select all that apply.    None of these   Not selected:    I'm currently taking pimozide    I'm currently taking thioridazine    I've taken an MAO inhibitor in the last 14 days    I've taken linezolid or IV methylene blue in the last 14 days   Are you still taking these medications listed in your medical record? If you're not taking any of these, click Next. Select all that apply.    glimepiride 4 MG tablet    spironolactone 25 MG tablet    multivitamin with " minerals tablet tablet    aspirin 81 MG chewable tablet    furosemide 40 MG tablet    amLODIPine 10 MG tablet    metoprolol tartrate 50 MG tablet    Ozempic (1 MG/DOSE) 4 MG/3ML solution pen-injector    benazepril 20 MG tablet    metFORMIN 1000 MG tablet    simvastatin 20 MG tablet   Are you taking any other medications, vitamins, or supplements? Select one.    No   Not selected:    Yes   Have you ever had an allergic or bad reaction to any medication? Select one.    No   Not selected:    Yes   If medication is recommended as part of your treatment plan, is that something you're willing to try? Select one.    Yes   Not selected:    No   Do you need a doctor's note? A doctor's note confirms that you received care today and states when you can return to school or work. It does not contain information about your diagnosis or treatment plan. Your provider will make the final decision on whether to give you a doctor's note. Doctor's notes CANNOT be backdated. Select one.    No   Not selected:    Today only (1 day)    Today and tomorrow (2 days)    3 days   What is the main reason you're taking this interview today?    I took Lexapro for almost 20 years. It did well in almost eliminating my panic attacks. I decided to stop taking it a month ago. Over the last few weeks, I have had a return of anxiety symptoms that were the reason I started taking it in the first place back when I was in college. Panic attacks, heart racing, general anxiety. I feel that I am angrier and have a quicker temper.   ----------   Medical history   Medical history data does not currently exist for this patient.

## 2023-02-08 ENCOUNTER — TELEPHONE (OUTPATIENT)
Dept: ENDOCRINOLOGY | Age: 48
End: 2023-02-08

## 2023-02-08 DIAGNOSIS — E11.65 TYPE 2 DIABETES MELLITUS WITH HYPERGLYCEMIA, WITHOUT LONG-TERM CURRENT USE OF INSULIN: Primary | ICD-10-CM

## 2023-02-13 ENCOUNTER — LAB (OUTPATIENT)
Dept: ENDOCRINOLOGY | Age: 48
End: 2023-02-13
Payer: COMMERCIAL

## 2023-02-13 DIAGNOSIS — E11.65 TYPE 2 DIABETES MELLITUS WITH HYPERGLYCEMIA, WITHOUT LONG-TERM CURRENT USE OF INSULIN: ICD-10-CM

## 2023-02-14 LAB
ALBUMIN SERPL-MCNC: 4.4 G/DL (ref 3.5–5.2)
ALBUMIN/CREAT UR: 397 MG/G CREAT (ref 0–29)
ALBUMIN/GLOB SERPL: 1.7 G/DL
ALP SERPL-CCNC: 87 U/L (ref 39–117)
ALT SERPL-CCNC: 9 U/L (ref 1–41)
AST SERPL-CCNC: 17 U/L (ref 1–40)
BILIRUB SERPL-MCNC: 0.3 MG/DL (ref 0–1.2)
BUN SERPL-MCNC: 14 MG/DL (ref 6–20)
BUN/CREAT SERPL: 10.8 (ref 7–25)
CALCIUM SERPL-MCNC: 9.4 MG/DL (ref 8.6–10.5)
CHLORIDE SERPL-SCNC: 102 MMOL/L (ref 98–107)
CHOLEST SERPL-MCNC: 124 MG/DL (ref 0–200)
CO2 SERPL-SCNC: 27.7 MMOL/L (ref 22–29)
CREAT SERPL-MCNC: 1.3 MG/DL (ref 0.76–1.27)
CREAT UR-MCNC: 200.2 MG/DL
EGFRCR SERPLBLD CKD-EPI 2021: 67.8 ML/MIN/1.73
GLOBULIN SER CALC-MCNC: 2.6 GM/DL
GLUCOSE SERPL-MCNC: 94 MG/DL (ref 65–99)
HBA1C MFR BLD: 8.6 % (ref 4.8–5.6)
HDLC SERPL-MCNC: 36 MG/DL (ref 40–60)
IMP & REVIEW OF LAB RESULTS: NORMAL
LDLC SERPL CALC-MCNC: 62 MG/DL (ref 0–100)
MICROALBUMIN UR-MCNC: 794.4 UG/ML
POTASSIUM SERPL-SCNC: 3.9 MMOL/L (ref 3.5–5.2)
PROT SERPL-MCNC: 7 G/DL (ref 6–8.5)
SODIUM SERPL-SCNC: 142 MMOL/L (ref 136–145)
TRIGL SERPL-MCNC: 149 MG/DL (ref 0–150)
VLDLC SERPL CALC-MCNC: 26 MG/DL (ref 5–40)

## 2023-02-20 ENCOUNTER — OFFICE VISIT (OUTPATIENT)
Dept: ENDOCRINOLOGY | Age: 48
End: 2023-02-20
Payer: COMMERCIAL

## 2023-02-20 VITALS
DIASTOLIC BLOOD PRESSURE: 90 MMHG | WEIGHT: 315 LBS | BODY MASS INDEX: 39.17 KG/M2 | HEIGHT: 75 IN | TEMPERATURE: 97.1 F | HEART RATE: 77 BPM | OXYGEN SATURATION: 96 % | SYSTOLIC BLOOD PRESSURE: 160 MMHG

## 2023-02-20 DIAGNOSIS — E78.5 HYPERLIPIDEMIA ASSOCIATED WITH TYPE 2 DIABETES MELLITUS: ICD-10-CM

## 2023-02-20 DIAGNOSIS — R80.1 PERSISTENT PROTEINURIA: ICD-10-CM

## 2023-02-20 DIAGNOSIS — E11.42 DIABETIC PERIPHERAL NEUROPATHY ASSOCIATED WITH TYPE 2 DIABETES MELLITUS: ICD-10-CM

## 2023-02-20 DIAGNOSIS — E11.69 HYPERLIPIDEMIA ASSOCIATED WITH TYPE 2 DIABETES MELLITUS: ICD-10-CM

## 2023-02-20 DIAGNOSIS — E11.65 TYPE 2 DIABETES MELLITUS WITH HYPERGLYCEMIA, WITHOUT LONG-TERM CURRENT USE OF INSULIN: Primary | ICD-10-CM

## 2023-02-20 PROCEDURE — 99214 OFFICE O/P EST MOD 30 MIN: CPT | Performed by: NURSE PRACTITIONER

## 2023-02-20 RX ORDER — SEMAGLUTIDE 1.34 MG/ML
1 INJECTION, SOLUTION SUBCUTANEOUS WEEKLY
Qty: 12 ML | Refills: 1 | Status: SHIPPED | OUTPATIENT
Start: 2023-02-20

## 2023-02-20 RX ORDER — GLIMEPIRIDE 4 MG/1
4 TABLET ORAL 2 TIMES DAILY WITH MEALS
Qty: 60 TABLET | Refills: 11 | Status: SHIPPED | OUTPATIENT
Start: 2023-02-20

## 2023-02-20 RX ORDER — SIMVASTATIN 20 MG
20 TABLET ORAL NIGHTLY
Qty: 90 TABLET | Refills: 1 | Status: SHIPPED | OUTPATIENT
Start: 2023-02-20

## 2023-02-20 NOTE — PROGRESS NOTES
"Chief Complaint  Diabetes (Type 2: Pt doesn't have meter, checks bs every few days, is up to date on eye exam, no hx of retinopathy, moderate to sever neuropathy in feet. Pt states that he is wanting to talk about his BP. )    Subjective        Gerry Thomas presents to [facility] ENDOCRINOLOGY  History of Present Illness     Had stopped januvia and ozempic for a few months- higher a1c    Lab Results   Component Value Date    HGBA1C 8.60 (H) 02/13/2023     He was only on glimepiride and metformin for the last few months     Type 2 dm    Diagnosed in 2009  Checks BG - once a day  Dm retinopathy -yes, also had shingles in his eye recently and followed with dr chirinos, retinopathy stable   Dm nephropathy - (+) proteinuria on ace-i  Dm neuropathy - yes, stable    CAD - denies   CVA - denies   Episodes of hypoglycemia - denies   On statin      Objective   Vital Signs:  /90   Pulse 77   Temp 97.1 °F (36.2 °C) (Temporal)   Ht 190.5 cm (75\")   Wt (!) 182 kg (400 lb 3.2 oz)   SpO2 96%   BMI 50.02 kg/m²   Estimated body mass index is 50.02 kg/m² as calculated from the following:    Height as of this encounter: 190.5 cm (75\").    Weight as of this encounter: 182 kg (400 lb 3.2 oz).             Physical Exam  Vitals reviewed.   Constitutional:       General: He is not in acute distress.  HENT:      Head: Normocephalic and atraumatic.   Eyes:      General:         Right eye: No discharge.         Left eye: No discharge.   Pulmonary:      Effort: Pulmonary effort is normal. No respiratory distress.   Musculoskeletal:         General: No signs of injury. Normal range of motion.      Cervical back: Normal range of motion and neck supple.   Skin:     General: Skin is warm and dry.   Neurological:      Mental Status: He is alert and oriented to person, place, and time. Mental status is at baseline.   Psychiatric:         Mood and Affect: Mood normal.         Behavior: Behavior normal.         Thought " Content: Thought content normal.         Judgment: Judgment normal.        Result Review :  The following data was reviewed by: KAREN Toribio on 02/20/2023:  Common labs    Common Labs 7/18/22 7/18/22 11/16/22 11/16/22 11/16/22 2/13/23 2/13/23 2/13/23 2/13/23    0901 0901 1525 1525 1525 1500 1500 1500 1500   Glucose  219 (A) 300 (A)     94    BUN  23 19     14    Creatinine  1.23 1.28 (A)     1.30 (A)    Sodium  140 139     142    Potassium  3.5 3.9     3.9    Chloride  98 99     102    Calcium  9.1 9.3     9.4    Total Protein        7.0    Albumin        4.4    Total Bilirubin        0.3    Alkaline Phosphatase        87    AST (SGOT)        17    ALT (SGPT)        9    Total Cholesterol     160  124     Triglycerides     368 (A)  149     HDL Cholesterol     33 (A)  36 (A)     LDL Cholesterol      69  62     Hemoglobin A1C 11.3 (A)   7.50 (A)     8.60 (A)   Microalbumin, Urine      794.4      (A) Abnormal value       Comments are available for some flowsheets but are not being displayed.                        Assessment and Plan   Diagnoses and all orders for this visit:    1. Type 2 diabetes mellitus with hyperglycemia, without long-term current use of insulin (HCC) (Primary)    2. Hyperlipidemia associated with type 2 diabetes mellitus (HCC)    3. Diabetic peripheral neuropathy associated with type 2 diabetes mellitus (HCC)    4. Persistent proteinuria    Other orders  -     glimepiride (AMARYL) 4 MG tablet; Take 1 tablet by mouth 2 (Two) Times a Day With Meals.  Dispense: 60 tablet; Refill: 11  -     metFORMIN (GLUCOPHAGE) 1000 MG tablet; Take 1 tablet by mouth 2 (Two) Times a Day With Meals.  Dispense: 60 tablet; Refill: 11  -     Semaglutide, 1 MG/DOSE, (Ozempic, 1 MG/DOSE,) 4 MG/3ML solution pen-injector; Inject 1 mg under the skin into the appropriate area as directed 1 (One) Time Per Week.  Dispense: 12 mL; Refill: 1  -     simvastatin (ZOCOR) 20 MG tablet; Take 1 tablet by mouth Every Night.   Dispense: 90 tablet; Refill: 1             Follow Up   Return in about 6 weeks (around 4/3/2023).     To see waller in 6 weeks re: PCP wanting endo w/u for HTN ( also follows with cards), labs same day    a1c worse since he was off medication. Continue ozempic, glimepiride and metformin, will increase dose to 2mg of ozempic if a1c does not improve   No need to continue januvia on ozempic   LDL at goal, continue statin  Continue ace-I    Patient was given instructions and counseling regarding his condition or for health maintenance advice. Please see specific information pulled into the AVS if appropriate.     Malaika Medina APRN

## 2023-02-22 ENCOUNTER — TRANSCRIBE ORDERS (OUTPATIENT)
Dept: ADMINISTRATIVE | Facility: HOSPITAL | Age: 48
End: 2023-02-22
Payer: COMMERCIAL

## 2023-02-22 DIAGNOSIS — R01.1 MURMUR: Primary | ICD-10-CM

## 2023-02-23 ENCOUNTER — PRIOR AUTHORIZATION (OUTPATIENT)
Dept: ENDOCRINOLOGY | Age: 48
End: 2023-02-23
Payer: COMMERCIAL

## 2023-02-28 ENCOUNTER — OFFICE VISIT (OUTPATIENT)
Dept: CARDIOLOGY | Facility: CLINIC | Age: 48
End: 2023-02-28
Payer: COMMERCIAL

## 2023-02-28 VITALS
DIASTOLIC BLOOD PRESSURE: 100 MMHG | WEIGHT: 315 LBS | HEART RATE: 76 BPM | SYSTOLIC BLOOD PRESSURE: 145 MMHG | BODY MASS INDEX: 39.17 KG/M2 | HEIGHT: 75 IN

## 2023-02-28 DIAGNOSIS — I50.31 ACUTE DIASTOLIC CHF (CONGESTIVE HEART FAILURE): ICD-10-CM

## 2023-02-28 DIAGNOSIS — I10 ESSENTIAL HYPERTENSION: Primary | ICD-10-CM

## 2023-02-28 PROCEDURE — 99214 OFFICE O/P EST MOD 30 MIN: CPT | Performed by: INTERNAL MEDICINE

## 2023-03-06 ENCOUNTER — TELEPHONE (OUTPATIENT)
Dept: CARDIOLOGY | Facility: CLINIC | Age: 48
End: 2023-03-06
Payer: COMMERCIAL

## 2023-03-06 NOTE — TELEPHONE ENCOUNTER
Had a quick phone conversation with the patient.  He is using his CPAP.  He is sending in a blood pressure log this week.  He has an echocardiogram scheduled tomorrow.

## 2023-03-07 ENCOUNTER — HOSPITAL ENCOUNTER (OUTPATIENT)
Dept: CARDIOLOGY | Facility: HOSPITAL | Age: 48
Discharge: HOME | End: 2023-03-07
Admitting: FAMILY MEDICINE
Payer: COMMERCIAL

## 2023-03-07 VITALS
SYSTOLIC BLOOD PRESSURE: 181 MMHG | HEIGHT: 75 IN | BODY MASS INDEX: 39.17 KG/M2 | DIASTOLIC BLOOD PRESSURE: 82 MMHG | WEIGHT: 315 LBS | HEART RATE: 67 BPM

## 2023-03-07 DIAGNOSIS — R01.1 MURMUR: ICD-10-CM

## 2023-03-07 LAB
AORTIC DIMENSIONLESS INDEX: 0.9 (DI)
ASCENDING AORTA: 3.7 CM
BH CV ECHO MEAS - ACS: 2.18 CM
BH CV ECHO MEAS - AO MAX PG: 7.4 MMHG
BH CV ECHO MEAS - AO MEAN PG: 4.8 MMHG
BH CV ECHO MEAS - AO ROOT DIAM: 3.6 CM
BH CV ECHO MEAS - AO V2 MAX: 135.7 CM/SEC
BH CV ECHO MEAS - AO V2 VTI: 28.6 CM
BH CV ECHO MEAS - AVA(I,D): 3.7 CM2
BH CV ECHO MEAS - EDV(CUBED): 47.8 ML
BH CV ECHO MEAS - EDV(MOD-SP2): 153 ML
BH CV ECHO MEAS - EDV(MOD-SP4): 280 ML
BH CV ECHO MEAS - EF(MOD-BP): 62.9 %
BH CV ECHO MEAS - EF(MOD-SP2): 62.7 %
BH CV ECHO MEAS - EF(MOD-SP4): 62.5 %
BH CV ECHO MEAS - ESV(CUBED): 16.4 ML
BH CV ECHO MEAS - ESV(MOD-SP2): 57 ML
BH CV ECHO MEAS - ESV(MOD-SP4): 105 ML
BH CV ECHO MEAS - FS: 30 %
BH CV ECHO MEAS - IVS/LVPW: 0.94 CM
BH CV ECHO MEAS - IVSD: 1.35 CM
BH CV ECHO MEAS - LAT PEAK E' VEL: 8.2 CM/SEC
BH CV ECHO MEAS - LV MASS(C)D: 180.3 GRAMS
BH CV ECHO MEAS - LV MAX PG: 6.4 MMHG
BH CV ECHO MEAS - LV MEAN PG: 3 MMHG
BH CV ECHO MEAS - LV V1 MAX: 126.1 CM/SEC
BH CV ECHO MEAS - LV V1 VTI: 25.6 CM
BH CV ECHO MEAS - LVIDD: 3.6 CM
BH CV ECHO MEAS - LVIDS: 2.5 CM
BH CV ECHO MEAS - LVOT AREA: 4.1 CM2
BH CV ECHO MEAS - LVOT DIAM: 2.29 CM
BH CV ECHO MEAS - LVPWD: 1.43 CM
BH CV ECHO MEAS - MED PEAK E' VEL: 8.4 CM/SEC
BH CV ECHO MEAS - MV A DUR: 0.16 SEC
BH CV ECHO MEAS - MV A MAX VEL: 80 CM/SEC
BH CV ECHO MEAS - MV DEC SLOPE: 312.6 CM/SEC2
BH CV ECHO MEAS - MV DEC TIME: 0.41 MSEC
BH CV ECHO MEAS - MV E MAX VEL: 55.7 CM/SEC
BH CV ECHO MEAS - MV E/A: 0.7
BH CV ECHO MEAS - MV MAX PG: 3.1 MMHG
BH CV ECHO MEAS - MV MEAN PG: 1.5 MMHG
BH CV ECHO MEAS - MV P1/2T: 77.6 MSEC
BH CV ECHO MEAS - MV V2 VTI: 25.8 CM
BH CV ECHO MEAS - MVA(P1/2T): 2.8 CM2
BH CV ECHO MEAS - MVA(VTI): 4.1 CM2
BH CV ECHO MEAS - PA ACC TIME: 0.13 SEC
BH CV ECHO MEAS - PA PR(ACCEL): 20.8 MMHG
BH CV ECHO MEAS - PA V2 MAX: 134.4 CM/SEC
BH CV ECHO MEAS - PULM A REVS DUR: 0.13 SEC
BH CV ECHO MEAS - PULM A REVS VEL: 20.8 CM/SEC
BH CV ECHO MEAS - PULM DIAS VEL: 27.1 CM/SEC
BH CV ECHO MEAS - PULM S/D: 0.96
BH CV ECHO MEAS - PULM SYS VEL: 26.1 CM/SEC
BH CV ECHO MEAS - QP/QS: 1.02
BH CV ECHO MEAS - RV MAX PG: 4.3 MMHG
BH CV ECHO MEAS - RV V1 MAX: 103.8 CM/SEC
BH CV ECHO MEAS - RV V1 VTI: 20.1 CM
BH CV ECHO MEAS - RVOT DIAM: 2.6 CM
BH CV ECHO MEAS - SV(LVOT): 105.2 ML
BH CV ECHO MEAS - SV(MOD-SP2): 96 ML
BH CV ECHO MEAS - SV(MOD-SP4): 175 ML
BH CV ECHO MEAS - SV(RVOT): 107.2 ML
BH CV ECHO MEAS - TAPSE (>1.6): 2.07 CM
BH CV ECHO MEASUREMENTS AVERAGE E/E' RATIO: 6.71
BH CV XLRA - RV BASE: 4.7 CM
BH CV XLRA - RV LENGTH: 9.8 CM
BH CV XLRA - RV MID: 4.1 CM
BH CV XLRA - TDI S': 13.4 CM/SEC
LEFT ATRIUM VOLUME INDEX: 43.8 ML/M2
MAXIMAL PREDICTED HEART RATE: 172 BPM
SINUS: 3.7 CM
STJ: 3.3 CM
STRESS TARGET HR: 146 BPM

## 2023-03-07 PROCEDURE — 93306 TTE W/DOPPLER COMPLETE: CPT | Performed by: INTERNAL MEDICINE

## 2023-03-07 PROCEDURE — 25510000001 PERFLUTREN (DEFINITY) 8.476 MG IN SODIUM CHLORIDE (PF) 0.9 % 10 ML INJECTION: Performed by: FAMILY MEDICINE

## 2023-03-07 PROCEDURE — 93306 TTE W/DOPPLER COMPLETE: CPT

## 2023-03-07 RX ADMIN — SODIUM CHLORIDE 2 ML: 9 INJECTION INTRAMUSCULAR; INTRAVENOUS; SUBCUTANEOUS at 15:09

## 2023-04-03 ENCOUNTER — OFFICE VISIT (OUTPATIENT)
Dept: ENDOCRINOLOGY | Age: 48
End: 2023-04-03
Payer: COMMERCIAL

## 2023-04-03 VITALS
WEIGHT: 315 LBS | SYSTOLIC BLOOD PRESSURE: 168 MMHG | OXYGEN SATURATION: 97 % | HEART RATE: 69 BPM | HEIGHT: 75 IN | BODY MASS INDEX: 39.17 KG/M2 | DIASTOLIC BLOOD PRESSURE: 82 MMHG | TEMPERATURE: 96.9 F

## 2023-04-03 DIAGNOSIS — E78.5 HYPERLIPIDEMIA ASSOCIATED WITH TYPE 2 DIABETES MELLITUS: ICD-10-CM

## 2023-04-03 DIAGNOSIS — E66.9 OBESITY WITH SERIOUS COMORBIDITY, UNSPECIFIED CLASSIFICATION, UNSPECIFIED OBESITY TYPE: ICD-10-CM

## 2023-04-03 DIAGNOSIS — E11.69 HYPERLIPIDEMIA ASSOCIATED WITH TYPE 2 DIABETES MELLITUS: ICD-10-CM

## 2023-04-03 DIAGNOSIS — E11.65 TYPE 2 DIABETES MELLITUS WITH HYPERGLYCEMIA, WITHOUT LONG-TERM CURRENT USE OF INSULIN: Primary | ICD-10-CM

## 2023-04-03 PROCEDURE — 99214 OFFICE O/P EST MOD 30 MIN: CPT | Performed by: INTERNAL MEDICINE

## 2023-04-03 RX ORDER — BENAZEPRIL HYDROCHLORIDE 40 MG/1
TABLET, FILM COATED ORAL
COMMUNITY
Start: 2023-03-27

## 2023-04-03 NOTE — PROGRESS NOTES
Chief complaint:  T2DM    HPI:   - 47 year old male here for management of diabetes mellitus type 2  - Has had diabetes since 2009  - Complications include CKD with proteinuria on benazapril  - No known complications to date  - Is currently taking Ozempic 1 mg weekly, metformin 1 g bid, Amaryl 4 mg bid  - States his diet is poor and he has gained weight recently  - He is also on simvastatin 20 mg daily    The following portions of the patient's history were reviewed and updated as appropriate: allergies, current medications, past family history, past medical history, past social history, past surgical history and problem list.    Review of Systems   Constitutional: Negative.    Psychiatric/Behavioral: Negative.        Objective     Vitals:    04/03/23 1457   BP: 168/82   Pulse: 69   Temp: 96.9 °F (36.1 °C)   SpO2: 97%        Physical Exam  Constitutional:       Appearance: Normal appearance. He is obese.   HENT:      Head: Normocephalic.   Eyes:      General: No scleral icterus.     Conjunctiva/sclera: Conjunctivae normal.   Pulmonary:      Effort: Pulmonary effort is normal.   Neurological:      Mental Status: He is alert.      Cranial Nerves: No cranial nerve deficit.      Gait: Gait normal.   Psychiatric:         Mood and Affect: Mood normal.         Behavior: Behavior normal.         Thought Content: Thought content normal.         Judgment: Judgment normal.       Labs/Imaging:  A1c was 8.6% in 2/2023    Assessment & Plan   1. Type 2 DM, uncontrolled due to hyperglycemia  - Cont. Ozempic 1 mg weekly, metformin 1 g bid, Amaryl 4 mg bid  - Will check A1c in 1 month, will add SGLT2 and/or increase Ozempic if A1c is above goal    2. Obesity  - Discussed how dietary changes and exercise will help glycemic control    3. Hyperlipidemia  - On atorvastatin 20 mg daily    - Return to clinic in 7 months

## 2023-04-04 ENCOUNTER — PATIENT ROUNDING (BHMG ONLY) (OUTPATIENT)
Dept: ENDOCRINOLOGY | Age: 48
End: 2023-04-04
Payer: COMMERCIAL

## 2023-05-31 RX ORDER — FUROSEMIDE 40 MG/1
TABLET ORAL
Qty: 60 TABLET | Refills: 5 | Status: SHIPPED | OUTPATIENT
Start: 2023-05-31

## 2023-08-20 ENCOUNTER — E-VISIT (OUTPATIENT)
Dept: ADMINISTRATIVE | Facility: OTHER | Age: 48
End: 2023-08-20
Payer: COMMERCIAL

## 2023-08-20 ENCOUNTER — TELEMEDICINE (OUTPATIENT)
Dept: FAMILY MEDICINE CLINIC | Facility: TELEHEALTH | Age: 48
End: 2023-08-20
Payer: COMMERCIAL

## 2023-08-20 DIAGNOSIS — K04.7 DENTAL ABSCESS: Primary | ICD-10-CM

## 2023-08-20 RX ORDER — LIDOCAINE HYDROCHLORIDE 20 MG/ML
10 SOLUTION OROPHARYNGEAL
Status: SHIPPED | OUTPATIENT
Start: 2023-08-20 | End: 2023-08-27

## 2023-08-20 RX ORDER — PENICILLIN V POTASSIUM 500 MG/1
500 TABLET ORAL 4 TIMES DAILY
Qty: 28 TABLET | Refills: 0 | Status: SHIPPED | OUTPATIENT
Start: 2023-08-20 | End: 2023-08-27

## 2023-08-20 NOTE — PROGRESS NOTES
You have chosen to receive care through a telehealth visit.  Do you consent to use a video/audio connection for your medical care today? Yes     CHIEF COMPLAINT  Chief Complaint   Patient presents with    dental abscess         HPI  Gerry Thomas is a 47 y.o. male  presents with complaint of toothache to a tooth to right upper jaw.  He complains of pain into the cheekbone.  Symptoms started 2 days ago.    Review of Systems   HENT:  Positive for dental problem.    All other systems reviewed and are negative.    Past Medical History:   Diagnosis Date    Chest pressure     stated in 2- Dr. Galloway office note    Diabetes mellitus     Dyspnea     stated in 2- Dr. Galloway office note    Hypertension     Psoriasis     Sleep apnea     uses CPAP    Type 2 diabetes mellitus        Family History   Problem Relation Age of Onset    Stroke Father        Social History     Socioeconomic History    Marital status:    Tobacco Use    Smoking status: Never    Smokeless tobacco: Never   Substance and Sexual Activity    Alcohol use: Yes     Comment: socially;  caffeine use- diet mt dew    Drug use: Never    Sexual activity: Defer       Gerry Thomas  reports that he has never smoked. He has never used smokeless tobacco..           There were no vitals taken for this visit.    PHYSICAL EXAM  Physical Exam   Constitutional: He appears well-developed and well-nourished.   HENT:   Head: Normocephalic.   Slight swelling to right jaw   Eyes: Pupils are equal, round, and reactive to light.   Pulmonary/Chest: Effort normal.   Musculoskeletal: Normal range of motion.   Neurological: He is alert.   Psychiatric: He has a normal mood and affect.     Results for orders placed or performed during the hospital encounter of 03/07/23   Adult Transthoracic Echo Complete W/ Cont if Necessary Per Protocol   Result Value Ref Range    Target HR (85%) 146 bpm    Max. Pred. HR (100%) 172 bpm    EF(MOD-bp) 62.9 %    LVIDd 3.6 cm     LVIDs 2.5 cm    IVSd 1.35 cm    LVPWd 1.43 cm    FS 30.0 %    IVS/LVPW 0.94 cm    ESV(cubed) 16.4 ml    EDV(cubed) 47.8 ml    LVOT area 4.1 cm2    LV mass(C)d 180.3 grams    LVOT diam 2.29 cm    EDV(MOD-sp2) 153.0 ml    EDV(MOD-sp4) 280.0 ml    ESV(MOD-sp2) 57.0 ml    ESV(MOD-sp4) 105.0 ml    SV(MOD-sp2) 96.0 ml    SV(MOD-sp4) 175.0 ml    EF(MOD-sp2) 62.7 %    EF(MOD-sp4) 62.5 %    MV E max paresh 55.7 cm/sec    MV A max paresh 80.0 cm/sec    MV dec time 0.41 msec    MV E/A 0.70     Pulm A Revs Dur 0.13 sec    MV A dur 0.16 sec    LA ESV Index (BP) 43.8 ml/m2    Med Peak E' Paresh 8.4 cm/sec    Lat Peak E' Paresh 8.2 cm/sec    Avg E/e' ratio 6.71     SV(LVOT) 105.2 ml    SV(RVOT) 107.2 ml    Qp/Qs 1.02     RV Base 4.7 cm    RV Mid 4.1 cm    RV Length 9.8 cm    TAPSE (>1.6) 2.07 cm    RV S' 13.4 cm/sec    Pulm Sys Paresh 26.1 cm/sec    Pulm Horner Paresh 27.1 cm/sec    Pulm S/D 0.96     Pulm A Revs Paresh 20.8 cm/sec    LV V1 max 126.1 cm/sec    LV V1 max PG 6.4 mmHg    LV V1 mean PG 3.0 mmHg    LV V1 VTI 25.6 cm    Ao pk paresh 135.7 cm/sec    Ao max PG 7.4 mmHg    Ao mean PG 4.8 mmHg    Ao V2 VTI 28.6 cm    ELAINA(I,D) 3.7 cm2    MV max PG 3.1 mmHg    MV mean PG 1.50 mmHg    MV V2 VTI 25.8 cm    MV P1/2t 77.6 msec    MVA(P1/2t) 2.8 cm2    MVA(VTI) 4.1 cm2    MV dec slope 312.6 cm/sec2    RVOT diam 2.6 cm    RV V1 max PG 4.3 mmHg    RV V1 max 103.8 cm/sec    RV V1 VTI 20.1 cm    PA V2 max 134.4 cm/sec    PA acc time 0.13 sec    PA pr(Accel) 20.8 mmHg    Ao root diam 3.6 cm    ACS 2.18 cm    Sinus 3.7 cm    STJ 3.3 cm    Dimensionless Index 0.90 (DI)    Ascending aorta 3.7 cm       Diagnoses and all orders for this visit:    1. Dental abscess (Primary)  -     penicillin v potassium (VEETID) 500 MG tablet; Take 1 tablet by mouth 4 (Four) Times a Day for 7 days.  Dispense: 28 tablet; Refill: 0  -     Lidocaine Viscous HCl (XYLOCAINE) 2 % solution 10 mL          FOLLOW-UP  As discussed during visit with PCP/Virtual Care if no improvement or  Urgent Care/Emergency Department if worsening of symptoms    Patient verbalizes understanding of medication dosage, comfort measures, instructions for treatment and follow-up.    KAREN Ramos  08/20/2023  06:10 EDT    The use of a video visit has been reviewed with the patient and verbal informed consent has been obtained. Myself and Gerry Thomas participated in this visit. The patient is located in [address] [place] [address].    I am located in [place], [place]. [employer] and [employer] were utilized. I spent 20 minutes in the patient's chart for this visit.

## 2023-08-20 NOTE — E-VISIT ESCALATED
Chief Complaint: Mouth sores   Patient was shown the following escalation message:   Some conditions need a visit with a dentist   Because your teeth are affected, we can't provide care through this interview. You should speak with a dentist to get the best care.   ----------   Patient Interview Transcript:   Are your symptoms on the inside or the outside of your mouth? - Inside includes your tongue, inner cheek or lips, gums, or roof of mouth - Outside includes your lips, skin surrounding lips, or corners of mouth Select all that apply.    Inside   Not selected:    Outside   Which areas inside your mouth are affected? Select all that apply.    Roof of my mouth    Teeth   Not selected:    Tongue    Inner cheeks or inner lips    Gums    Floor of my mouth (below the tongue)    Throat   ----------   Medical history   Medical history data does not currently exist for this patient.

## 2023-08-31 ENCOUNTER — OFFICE VISIT (OUTPATIENT)
Dept: ORTHOPEDIC SURGERY | Facility: CLINIC | Age: 48
End: 2023-08-31
Payer: COMMERCIAL

## 2023-08-31 VITALS — TEMPERATURE: 98.3 F | WEIGHT: 315 LBS | BODY MASS INDEX: 39.17 KG/M2 | HEIGHT: 75 IN

## 2023-08-31 DIAGNOSIS — G89.29 CHRONIC PAIN OF LEFT KNEE: Primary | ICD-10-CM

## 2023-08-31 DIAGNOSIS — M17.12 PRIMARY OSTEOARTHRITIS OF LEFT KNEE: ICD-10-CM

## 2023-08-31 DIAGNOSIS — M25.562 CHRONIC PAIN OF LEFT KNEE: Primary | ICD-10-CM

## 2023-08-31 DIAGNOSIS — E66.01 OBESITY, MORBID, BMI 50 OR HIGHER: ICD-10-CM

## 2023-08-31 DIAGNOSIS — R52 PAIN: ICD-10-CM

## 2023-08-31 RX ORDER — FUROSEMIDE 40 MG/1
1 TABLET ORAL
COMMUNITY
Start: 2023-05-31

## 2023-08-31 RX ORDER — SPIRONOLACTONE 50 MG/1
50 TABLET, FILM COATED ORAL DAILY
COMMUNITY
End: 2023-09-08 | Stop reason: SDUPTHER

## 2023-09-01 NOTE — PROGRESS NOTES
General Exam    Patient: Gerry Thomas    YOB: 1975    Medical Record Number: [id number]    Chief Complaints: Left knee pain    History of Present Illness:     48 y.o. male patient who presents for evaluation of left knee pain.  Patient states he has had symptoms for some time on and off does have history of a knee scope in 95.  States mainly over the past week with walking no some popping and difficulty straighten the knee at times.  He noticed that went away then reoccurred over the weekend a few times.  Denies that his knee actually locks up and has not had any trauma or falls.    Denies any numbness or tingling.  Denies any fevers, cough or shortness of breath.    Allergies: No Known Allergies    Home Medications:      Current Outpatient Medications:     amLODIPine (NORVASC) 10 MG tablet, Take 1 tablet by mouth Daily., Disp: 90 tablet, Rfl: 3    aspirin 81 MG chewable tablet, Chew 1 tablet Daily., Disp: , Rfl:     benazepril (LOTENSIN) 40 MG tablet, , Disp: , Rfl:     escitalopram (LEXAPRO) 20 MG tablet, Take 1 tablet by mouth Daily., Disp: , Rfl:     furosemide (LASIX) 40 MG tablet, TAKE ONE TABLET BY MOUTH TWICE A DAY, Disp: 60 tablet, Rfl: 5    glimepiride (AMARYL) 4 MG tablet, Take 1 tablet by mouth 2 (Two) Times a Day With Meals., Disp: 60 tablet, Rfl: 11    metFORMIN (GLUCOPHAGE) 1000 MG tablet, Take 1 tablet by mouth 2 (Two) Times a Day With Meals., Disp: 60 tablet, Rfl: 11    metoprolol tartrate (LOPRESSOR) 50 MG tablet, Take 1 tablet by mouth 2 (Two) Times a Day., Disp: , Rfl:     multivitamin with minerals tablet tablet, Daily., Disp: , Rfl:     Semaglutide, 1 MG/DOSE, (Ozempic, 1 MG/DOSE,) 4 MG/3ML solution pen-injector, Inject 1 mg under the skin into the appropriate area as directed 1 (One) Time Per Week., Disp: 12 mL, Rfl: 1    simvastatin (ZOCOR) 20 MG tablet, Take 1 tablet by mouth Every Night., Disp: 90 tablet, Rfl: 1    spironolactone (ALDACTONE) 50 MG tablet, Take 1 tablet  "by mouth Daily., Disp: , Rfl:     benazepril (LOTENSIN) 20 MG tablet, TAKE ONE TABLET BY MOUTH TWICE A DAY (Patient not taking: Reported on 8/31/2023), Disp: 180 tablet, Rfl: 2    furosemide (LASIX) 40 MG tablet, Take 1 tablet by mouth. (Patient not taking: Reported on 8/31/2023), Disp: , Rfl:     spironolactone (ALDACTONE) 25 MG tablet, Take 1 tablet by mouth Daily. (Patient not taking: Reported on 8/31/2023), Disp: 30 tablet, Rfl: 11    Past Medical History:   Diagnosis Date    Chest pressure     stated in 2- Dr. Galloway office note    Diabetes mellitus     Dyspnea     stated in 2- Dr. Galloway office note    Hypertension     Psoriasis     Sleep apnea     uses CPAP    Type 2 diabetes mellitus        Past Surgical History:   Procedure Laterality Date    KNEE ARTHROSCOPY Right     age 20       Social History     Occupational History    Not on file   Tobacco Use    Smoking status: Never    Smokeless tobacco: Never   Vaping Use    Vaping Use: Never used   Substance and Sexual Activity    Alcohol use: Yes     Comment: socially;  caffeine use- diet mt dew    Drug use: Never    Sexual activity: Defer      Social History     Social History Narrative    Not on file       Family History   Problem Relation Age of Onset    Stroke Father        Review of Systems:      Constitutional: Denies fever, shaking or chills         All other pertinent positives and negatives as noted above in HPI.    Physical Exam: 48 y.o. male    Vitals:    08/31/23 1344   Temp: 98.3 °F (36.8 °C)   TempSrc: Temporal   Weight: (!) 187 kg (412 lb)   Height: 190.5 cm (75\")       General:  Patient is awake and alert.  Appears in no acute distress or discomfort.      Musculoskeletal/Extremities:    Left lower extremity examined no significant tenderness noted about the joint line.  With range of motion there is some popping/catching noted.  Knee range of motion overall full and painless.  Stable varus valgus stress.  Indeterminate Adrienne's.  " Motor and sensory intact distally.         Radiology:       AP pelvis and 4 views of the left knee including AP, lateral, sunrise and PA notch taken and reviewed to evaluate the patient's complaint/s.    AP pelvis shows mild degenerative changes hard to fully appreciate given soft tissue density    Knee imaging shows mild degenerative changes all 3 compartments with the patellofemoral compartment most affected is moderate in nature.  Evidence of bone sclerosis osteophyte formation.  Slight lateral tilt of the patella noted.     No imaging for comparison.    Assessment: Left knee osteoarthritis, possible meniscal involvement obesity      Plan:      I discussed the findings with the patient.  Given history and exam findings we will obtain MRI for further evaluation to rule out any internal derangement of the knee specifically meniscal involvement.  During the interim conservative treatments he rest, ice, activity modification, weight loss, anti-inflammatory therapy if can take and tolerate.  Patient will call several days after MRI is completed we can review results and discuss further plan as necessary for treatment.  We will go ahead and have follow-up scheduled.      We will plan for follow up 3 weeks.    All questions were answered.  Patient understands and agrees with the plan.    Oliverio Haddad MD    08/31/2023    CC to Jose Ramon Galloway MD

## 2023-09-08 ENCOUNTER — TELEPHONE (OUTPATIENT)
Dept: CARDIOLOGY | Facility: CLINIC | Age: 48
End: 2023-09-08
Payer: COMMERCIAL

## 2023-09-08 RX ORDER — SPIRONOLACTONE 50 MG/1
50 TABLET, FILM COATED ORAL DAILY
Qty: 90 TABLET | Refills: 1 | Status: SHIPPED | OUTPATIENT
Start: 2023-09-08

## 2023-09-08 RX ORDER — FUROSEMIDE 40 MG/1
40 TABLET ORAL 2 TIMES DAILY
Qty: 90 TABLET | Refills: 1 | Status: SHIPPED | OUTPATIENT
Start: 2023-09-08

## 2023-09-08 RX ORDER — METOPROLOL TARTRATE 50 MG/1
50 TABLET, FILM COATED ORAL 2 TIMES DAILY
Qty: 90 TABLET | Refills: 1 | Status: SHIPPED | OUTPATIENT
Start: 2023-09-08

## 2023-09-08 RX ORDER — AMLODIPINE BESYLATE 10 MG/1
10 TABLET ORAL DAILY
Qty: 90 TABLET | Refills: 3 | Status: SHIPPED | OUTPATIENT
Start: 2023-09-08

## 2023-09-08 NOTE — TELEPHONE ENCOUNTER
"Caller: Gerry Thomas \"NATHANIEL\"    Relationship to patient: Self    Best call back number: [phone]     Chief complaint: MEDICATION REFILLS    Type of visit: FU    Requested date: ASAP    If rescheduling, when is the original appointment: NA    Additional notes: PT NEEDING TO RSD - UNABLE TO SCHEDULE WITHIN 2023 AND NO AVAILABILITY FOR APRN OR MD -   "

## 2023-09-13 ENCOUNTER — PREP FOR SURGERY (OUTPATIENT)
Dept: OTHER | Facility: HOSPITAL | Age: 48
End: 2023-09-13
Payer: COMMERCIAL

## 2023-09-13 DIAGNOSIS — Z12.11 SCREEN FOR COLON CANCER: Primary | ICD-10-CM

## 2023-09-19 ENCOUNTER — TELEPHONE (OUTPATIENT)
Dept: GASTROENTEROLOGY | Facility: CLINIC | Age: 48
End: 2023-09-19

## 2023-09-19 NOTE — TELEPHONE ENCOUNTER
"Hub staff attempted to follow warm transfer process and was unsuccessful     Caller: Gerry Thomas \"NATHANIEL\"    Relationship to patient: Self    Best call back number: [phone]     Patient is needing: PT RETURNED CALL TO SCHEDULE COLONOSCOPY.     PLEASE REACH OUT TO PT AGAIN. THANKS.    "

## 2023-09-20 ENCOUNTER — HOSPITAL ENCOUNTER (OUTPATIENT)
Dept: MRI IMAGING | Facility: HOSPITAL | Age: 48
Discharge: HOME | End: 2023-09-20
Admitting: ORTHOPAEDIC SURGERY
Payer: COMMERCIAL

## 2023-09-20 DIAGNOSIS — R52 PAIN: ICD-10-CM

## 2023-09-20 DIAGNOSIS — M25.562 CHRONIC PAIN OF LEFT KNEE: ICD-10-CM

## 2023-09-20 DIAGNOSIS — G89.29 CHRONIC PAIN OF LEFT KNEE: ICD-10-CM

## 2023-09-20 PROCEDURE — 73721 MRI JNT OF LWR EXTRE W/O DYE: CPT

## 2023-09-20 RX ORDER — SIMVASTATIN 20 MG
TABLET ORAL
Qty: 90 TABLET | Refills: 1 | Status: SHIPPED | OUTPATIENT
Start: 2023-09-20

## 2023-09-25 ENCOUNTER — TELEPHONE (OUTPATIENT)
Dept: ORTHOPEDIC SURGERY | Facility: CLINIC | Age: 48
End: 2023-09-25

## 2023-09-25 NOTE — TELEPHONE ENCOUNTER
I called discussed findings of MRI with the patient.  He will see how things go we could always review in the office further evaluation if symptoms continue.

## 2023-10-02 ENCOUNTER — TELEPHONE (OUTPATIENT)
Dept: SURGERY | Facility: CLINIC | Age: 48
End: 2023-10-02
Payer: COMMERCIAL

## 2023-10-04 ENCOUNTER — TELEPHONE (OUTPATIENT)
Dept: CARDIOLOGY | Facility: CLINIC | Age: 48
End: 2023-10-04

## 2023-10-04 NOTE — TELEPHONE ENCOUNTER
"Caller: Gerry Thomas \"NATHANIEL\"    Relationship to patient: Self    Best call back number: [phone]     Chief complaint:     Type of visit: FU    Requested date: AS NEEDED    If rescheduling, when is the original appointment: 10.04.2023    Additional notes: PT HAS TESTED POSITIVE FOR COVID AND REQUESTING A NEW APPT - NEXT AVAILABLE IS IN JANUARY FOR MD AND APRN     "

## 2023-10-04 NOTE — LETTER
Gerry Thomas   [address] [place] [id number]         October 18, 2023       The staff at [facility] are trying to reach you to reschedule your follow-up appointment.  Please call the office at [phone] and ask to speak with the scheduling department at your earliest convenience.      Thank you,  [facility] Triage Department

## 2023-10-12 NOTE — TELEPHONE ENCOUNTER
Left voicemail for Gerry Thomas requesting callback.    Thank you,  Malaika CHU RN  Triage Nurse [facility]   11:09 EDT

## 2023-10-13 NOTE — TELEPHONE ENCOUNTER
Left voicemail for Gerry Thomas requesting callback.    Thank you,  Malaika CHU RN  Triage Nurse [facility]   08:53 EDT

## 2023-10-16 NOTE — TELEPHONE ENCOUNTER
Left voicemail for Gerry Thomas requesting callback.    Thank you,  Malaika CHU RN  Triage Nurse [facility]   08:40 EDT

## 2023-10-17 NOTE — TELEPHONE ENCOUNTER
Left voicemail for Gerry Thomas requesting callback.    Thank you,  Malaika CHU RN  Triage Nurse [facility]   12:34 EDT

## 2023-10-23 ENCOUNTER — E-VISIT (OUTPATIENT)
Dept: FAMILY MEDICINE CLINIC | Facility: TELEHEALTH | Age: 48
End: 2023-10-23
Payer: COMMERCIAL

## 2023-10-23 PROCEDURE — BRIGHTMDVISIT: Performed by: NURSE PRACTITIONER

## 2023-10-23 NOTE — EXTERNAL PATIENT INSTRUCTIONS
View Doctor's Note     Diagnosis   Viral upper respiratory infection (URI)   My name is Mary LoyaKAREN vu, and I'm a healthcare provider at [facility]. I've reviewed your interview and based on your responses, I see that you have a viral upper respiratory infection. However, the exact type of virus causing your illness isn't clear.   Without testing, it can be hard to tell the difference between the common cold, the flu, or a COVID-19 infection. These illnesses share many of the same symptoms, including fever, fatigue, muscle or body aches, sore throat, runny or stuffy nose, and cough.   Most people with any of these illnesses have mild to moderate symptoms and can rest at home until they get better.   I've given you a doctor's note for 3 days.   I haven't prescribed any antibiotics. Antibiotics fight bacteria, not viruses. They don't help when you have a viral infection like the common cold, the flu, or a COVID-19 infection. Antibiotics could even make you feel worse as they can cause or worsen nausea, diarrhea, and stomach pain.    Stay home   While you're recovering, STAY HOME. Do not leave your home except to get medical care.   While at home, avoid close contact with others.   If possible, stay in a room away from other people in your home, and use a separate bathroom.   Wear a face mask when you can't avoid contact with other people.   If you can't wear a face mask because of breathing difficulty, your caregiver should wear a face mask.   Wearing a face mask does NOT mean you can leave your home. You must continue to stay home until you have recovered.   You can return to your normal activities when ALL of the following are true:    You've been fever-free for at least 24 hours (1 full day) without using fever-reducing medications such as Tylenol    Your symptoms have improved    It's been at least 5 full days since your symptoms first started   Prevention    Cover your mouth and nose with a tissue  "when you cough or sneeze. Throw used tissues in a lined trash can right away, and wash your hands immediately after.    Avoid sharing personal items like dishes, utensils, towels, or bedding. Wash items thoroughly with soap and water after use.    Wash your hands often with soap and water for at least 20 seconds. If soap and water are not available, clean your hands with a hand  that contains at least 60% alcohol. Cover all surfaces of your hands and rub them together until they feel dry.    Avoid touching your face, especially their eyes, nose, and mouth.    Clean \"high-touch\" surfaces daily. \"High-touch\" surfaces include counters, tabletops, doorknobs, bathroom fixtures, toilets, phones, keyboards, tablets, and bedside tables. You can use soap, detergents, 60%-80% ethanol or isopropyl alcohol,  such as Windex, or bleach. All of these  are effective at killing the virus that causes COVID-19.    Limit contact with pets and other animals while sick. If you must care for your pet, wash your hands before and after you interact with them and wear a face mask.   What to expect   Follow the advice in the treatment section below and you should feel better within 7 to 14 days. You may continue to feel tired and have a cough for several weeks.   Medications   Your pharmacy   [facility] [id number] [address] [place] [address] [phone]     Prescription   Ipratropium bromide nasal spray (0.06%): Spray 2 sprays in each nostril 3 times a day as needed for nasal symptoms.   Benzonatate (200mg): Take 1 capsule by mouth 3 times a day as needed for cough. Do not chew or cut these capsules.   Albuterol sulfate HFA (90mcg/puff): Inhale 2 puffs every 6 hours as needed for wheezing. If symptoms are severe, may use as often as every 4 hours.   About your diagnosis   The common cold, the flu (influenza), and COVID-19 are respiratory illnesses that are caused by viruses. While the " specific type of virus that causes these infections is different, the symptoms can often be similar. Fortunately, most people who get these infections have mild symptoms and can rest at home until they get better. For elderly people and those with chronic medical problems, these infections can be serious or life-threatening.   When to seek care   Call us at [phone]   with any sudden or unexpected symptoms.   Call your healthcare provider immediately if you have any of the following:    Fever over 103F    Fever that doesn't come down when you take Tylenol or ibuprofen    Fever that returns after being gone for more than 24 hours    Fever for more than 4 days    Worsening shortness of breath or difficulty breathing   Go to your nearest ER or call 911 if you have any of the following:    Shortness of breath that makes it difficult to do simple things like get dressed, bathe, or comb your hair    Persistent chest pain or chest tightness    New confusion or difficulty staying alert    Bluish color to the lips or face   Other treatment    Rest and drink plenty of sugar-free fluids.    Use a clean humidifier or a cool-mist vaporizer in your room at night. Breathing humid air may help with nasal congestion.    Gargle with salt water several times a day to help your throat feel better. Cough drops and throat lozenges may provide extra relief. A teaspoon of honey stirred into warm water or weak tea can help soothe a sore throat and cough.    Try using a Neti Pot to flush out your stuffy nose and sinuses. Neti Pots are available at any drugstore without a prescription.    Avoid smoke and air pollution. Smoke can make infections worse.    Coronavirus (COVID-19) information   Common symptoms of COVID-19 include fever, cough, shortness of breath, fatigue, muscle or body aches, headaches, new loss of sense of taste or smell, sore throat, stuffy or runny nose, nausea or vomiting, and diarrhea. Most people who get COVID-19  have mild symptoms and can rest at home until they get better. Elderly people and those with chronic medical problems may be at risk for more serious complications.   FAQs about the COVID-19 vaccine   There are four authorized COVID-19 vaccines: Jose & Jose's Afshan Vaccine (J&J/Afshan), Moderna, Novavax, and Pfizer-BioNTech (Pfizer). The J&J/Afshan and Novavax vaccines are approved for use in people aged 18 and older. The Moderna and Pfizer vaccines are approved for those aged 6 months and older. All four are available at no cost. Even if you don't have health insurance, you can still get the COVID-19 vaccine for free.   Which vaccine is the best? Which vaccine should I get?   All four vaccines are highly effective. Even if you get COVID-19 after being vaccinated, all of the vaccines help prevent severe disease, hospitalization, and complications.   Most people should get whichever vaccine is first available to them. However, women younger than 50 years old should consider the rare risk of blood clots with low platelets after vaccination with the J&J/Afshan vaccine. This risk hasn't been seen with the other three vaccines.   Are the vaccines safe?   Yes. Hundreds of millions of people in the [place] have already safely received COVID-19 vaccines under the most intense safety monitoring in the history of the [place].   Do I need the vaccine if I've already had COVID?   Yes. Vaccination helps protect you even if you've already had COVID.   If you had COVID-19 and had symptoms, wait to get vaccinated until you've recovered and completed your isolation period.   If you tested positive for COVID-19 but did not have symptoms, you can get vaccinated after 5 full days have passed since you had a positive test, as long as you don't develop symptoms.   How many doses of the vaccine do I need?   Visit www.cdc.gov/coronavirus/2019-ncov/vaccines/stay-up-to-date.html   to find out how to stay up to date with your COVID-19  vaccines.   I'm immunocompromised. How many doses of the vaccine do I need?   For information on how immunocompromised people can stay up to date with their COVID-19 vaccines, visit www.cdc.gov/coronavirus/2019-ncov/vaccines/recommendations/immuno.html  .   What are the common side effects of the vaccine?   A sore arm, tiredness, headache, and muscle pain may occur within two days of getting the vaccine and last a day or two. For the Moderna or Pfizer vaccines, side effects are more common after the second dose. People over the age of 55 are less likely to have side effects than younger people.   After I'm up to date on vaccines, can I still get or spread COVID?   Yes, you can still get COVID, but your disease should be milder. And your risk of serious illness, hospitalization, and complications will be much lower, especially if you're up to date. Unfortunately, you can still spread COVID if you've been vaccinated. That's why it's important to follow isolation guidelines if you get sick or test positive.   After I'm up to date on vaccines, can I go back to normal?   You should still wear a mask indoors in public if:    It's required by laws, rules, regulations, or local guidance.    You have a weakened immune system.    Your age puts you at increased risk of severe disease.    You have a medical condition that puts you at increased risk of severe disease.    Someone in your household has a weakened immune system, is at increased risk for severe disease, or is unvaccinated.    You're in an area of high transmission.   Where can I get a COVID-19 vaccine?   Visit [facility]'s website for more information. To find a COVID-19 vaccination site near you, visit www.vaccines.gov/  , call [phone]  , or text your zip code to 198674 (Pay with a Tweet). Message and data rates may apply.   What about travel?   Travel increases your risk of exposure to COVID-19. For more information, see  www.cdc.gov/coronavirus/2019-ncov/travelers/index.html  .   I've had close contact with someone who has COVID. Do I need to quarantine, and if so, for how long?   For the most current answer, including a calculator to determine whether you need to stay home and for how long, visit www.cdc.gov/coronavirus/2019-ncov/your-health/quarantine-isolation.html  .   I've tested positive for COVID. How long do I need to isolate?   For the latest recommendations, including a calculator to determine how long you need to stay home, visit www.cdc.gov/coronavirus/2019-ncov/your-health/quarantine-isolation.html  .   What if I develop symptoms that might be from COVID?   For the latest recommendations on what to do if you're sick, including when to seek emergency care, visit www.cdc.gov/coronavirus/2019-ncov/if-you-are-sick/steps-when-sick.html  .    Flu vaccine information   Who should get a flu vaccine?   Everyone 6 months of age and older should get a yearly flu vaccine.   When should I get vaccinated?   You should get a flu vaccine by the end of October. Once you're vaccinated, it takes about two weeks for antibodies to develop and protect you against the flu. That's why it's important to get vaccinated as soon as possible.   After October, is it too late to get vaccinated?   No. You should still get vaccinated. As long as the flu viruses are still in your community, flu vaccines will remain available, even into January of next year or later.   Why do I need a flu vaccine EVERY year?   Flu viruses are constantly changing, so flu vaccines are usually updated from one season to the next. Your protection from the flu vaccine also lessens over time.   Is the flu vaccine safe?   Yes. Over the last 50 years, hundreds of millions of Americans have safely received the flu vaccines.   What are the side effects of flu vaccines?   You CANNOT get the flu from a flu vaccine. Common side effects of the flu shot include soreness, redness and/or  swelling where the shot was given, low grade fever, and aches. Common side effects of the nasal spray flu vaccine for adults include runny nose, headaches, sore throat, and cough. For children, side effects include wheezing, vomiting, muscle aches, and fever.   Does the flu vaccine increase your risk of getting COVID-19?   No. There is no evidence that getting a flu vaccine increases your risk of getting COVID-19.   Is it safe to get the flu vaccine along with a COVID-19 vaccine?   Yes. It's safe to get the flu vaccine with a COVID-19 vaccine or booster.   Contact your healthcare provider TODAY for details on when and where to get your flu vaccine.   Your provider   Your diagnosis was provided by KAREN Hilton, a member of your trusted care team at [facility].   If you have any questions, call us at [phone]  .   View Doctor's Note     Expires on 11/22/23

## 2023-10-23 NOTE — E-VISIT TREATED
Chief Complaint: Cold, flu, COVID, sinus, hay fever, or seasonal allergies   Patient introduction   Patient is 48-year-old male with cough, congestion, nasal discharge, sore throat, voice hoarseness, headache, fever (which may have resolved; see below), and fatigue that started 3 to 5 days ago. Regarding date of symptom onset, patient writes: 10/19/23.   COVID-19 testing history, vaccination status, and exposure:    Patient had a viral lab test within the last week. Patient specifies date of test as 10/20/23. Test result was negative.    Patient was prompted to take a self-test during the interview, but elected not to test now.    Received 3 doses of the COVID-19 vaccine (Moderna, Moderna, Moderna). Received their most recent dose of the vaccine more than 14 days ago.    No high-risk (household) exposure to COVID-19 within the last 14 days.    No travel outside local community within the last 14 days.   Risk factors for severe disease from COVID-19 infection    BMI >= 40.    Hypertension.    Diabetes.    Moderate to severe plaque psoriasis.   Warning. The following may warrant further investigation:    Hypertension.   General presentation   Symptoms came on gradually.   Fever:    Current fever for 1 to 3 days.    Highest temperature of below 100.4F.   Sinus and nasal symptoms:    Nasal discharge.    Clear nasal drainage.    Nasal drainage is thick.    Postnasal drip.    No sinus pain or pressure.    No itchy nose or sneezing.   Throat symptoms:    Sore throat.    Has pain when swallowing but can swallow liquids and solid foods.    Voice is mildly hoarse. Patient does not believe hoarseness is due to voice strain.    No muffled voice.   Head and body aches:    Headache described as moderate (4 to 6 on a scale of 1 to 10).    Fatigue.    No sweats.    No chills.    No myalgia.   Cough:    Cough is worse in the morning.    Cough is productive of sputum.    Describes color of sputum as clear.   Wheezing and shortness  of breath:    Wheezing.    No COPD diagnosis.    No asthma diagnosis.    No shortness of breath.    No previous albuterol inhaler use for URIs, bronchitis, or pneumonia.    No previous steroid inhaler use for URIs, bronchitis, or pneumonia.   Chest pain:    Has chest pain, but only when coughing.    Chest pain is not the patient's chief complaint.    [place] Heart Score (MHS): 0, low risk of CAD. Assigning 1 point to each of 5 criteria (female >= 65 years old or male >= 55 years, known CAD, pain worse with exercise, pain not reproducible with palpation, and patient assumes pain is coming from their heart), the MHS is a validated clinical decision rule used to rule out coronary artery disease in primary care patients with chest pain.   Ear symptoms:    None.   Dizziness:    No dizziness.   Allergies:    Patient has known perennial allergies.    Patient does not think symptoms are allergy-related.   Flu exposure:    No recent known exposure to a person with a confirmed flu diagnosis.    Has not had a flu vaccine this season.   Patient is taking over-the-counter medications for current symptoms, including acetaminophen, guaifenesin, ibuprofen, and oxymetazoline.   Review of red flags/alarm symptoms:    No changes in alertness or awareness.    No nuchal rigidity.    No symptoms suggesting airway obstruction.    No muffled voice.    No symptoms suggesting intracranial hemorrhage.    No fever above 100.4F lasting longer than 4 days.    No decreased urination.   Risk factors for antibiotic resistance:    Diabetes.   Self-exam:    Height: 190 centimeters    Weight: 181.4 kilograms    No difficulty moving their chin toward their chest.    No tonsillar edema.    Tonsils appear normal.    No palatal petechiae.    Able to open mouth normally.    Neck lymph nodes feel normal.   Recent antibiotic use:    Has not taken antibiotics for similar symptoms within the past month.   Current medications   Currently taking furosemide 40 MG  tablet, spironolactone 50 MG tablet, multivitamin with minerals tablet tablet, aspirin 81 MG chewable tablet, metFORMIN 1000 MG tablet, simvastatin 20 MG tablet, amLODIPine 10 MG tablet, metoprolol tartrate 50 MG tablet, escitalopram 20 MG tablet, glimepiride 4 MG tablet, and benazepril 40 MG tablet.   Medication allergies   None.   Medication contraindication review   Patient has history of hypertension. Therefore, the following medication(s) will not be prescribed:    Metoclopramide.    Acetaminophen-diphenhydramine-phenylephrine.    Pseudoephedrine.    Aspirin-chlorpheniramine-phenylephrine.   No history of metoclopramide-associated dystonic reaction and tardive dyskinesia.   No known history of amoxicillin-clavulanate-associated cholestatic jaundice or hepatic impairment.   No known history of azithromycin-associated cholestatic jaundice or hepatic impairment.   Past medical history   Immune conditions: Patient has moderate to severe plaque psoriasis. Patient is not currently taking medications for their condition(s).   No history of cancer.   Social history   Never smoked tobacco.   High-risk household contacts:    Household contact with diabetes.   Patient requests a 3-day excuse note.   Assessment   Viral URI, cannot rule out influenza or COVID-19.   Based on the patient's interview responses and presenting symptoms, exact etiology of illness is unclear. Differential includes viral URI, influenza, and COVID-19.   Patient is at higher risk for complications from influenza or severe disease with COVID-19 infection.   Other possible indications for empiric antiviral treatment include:    Household contact with diabetes   Plan   Medications:    ipratropium bromide 42 mcg (0.06 %) nasal spray RX 0.06% 2 sprays nasal tid PRN 4d for nasal symptoms. Amount is 15 mL.    benzonatate 200 mg capsule RX 200mg 1 cap PO tid PRN 7d for cough. Do not chew or cut these capsules. Amount is 21 cap.    albuterol sulfate HFA 90  mcg/actuation aerosol inhaler RX 90mcg/puff 2 puffs inhaled q6h PRN 10d for wheezing. If symptoms are severe, may use as often as every 4 hours. Amount is 1 each.   The patient's prescriptions will be sent to:   [facility] [id number]   [address] [place] [address]   Phone: [phone]     Fax: [phone]   Other:   Patient was given an excuse note for 3 days.   Education:    Condition and causes    Prevention    Treatment and self-care    When to call provider   ----------   Electronically signed by KAREN Hilton on 2023-10-23 at 11:31AM   ----------   Patient Interview Transcript:   Which of these symptoms are bothering you? Select all that apply.    Cough    Stuffed-up nose or sinuses    Runny nose    Sore throat    Hoarse voice or loss of voice    Headache    Fever    Fatigue or tiredness   Not selected:    Shortness of breath    Itchy or watery eyes    Itchy nose or sneezing    Loss of smell or taste    Sweats    Chills    Muscle or body aches    Nausea or vomiting    Diarrhea    I don't have any of these symptoms   When did your current symptoms start? Select one.    3 to 5 days ago   Not selected:    Less than 48 hours ago    6 to 9 days ago    10 to 14 days ago    2 to 3 weeks ago    3 to 4 weeks ago    More than a month ago   Do you know the exact date your symptoms started? If so, enter the date as MM/DD/YY. Select one.    Yes (specify): 10/19/23   Not selected:    No   Did your symptoms come on suddenly or gradually? Select one.    Gradually   Not selected:    Suddenly    I'm not sure   Since your current symptoms started, have you had a viral test for COVID-19? - This includes home self-tests as well as nose swab or saliva tests done at a doctor's office, lab, or testing site. - It does NOT include antibody tests, which use a blood sample to test for past infection. Select one.    Yes   Not selected:    No   When was your most recent COVID-19 test? Select one.    Within the  last week (specify date as MM/DD/YY): 10/20/23   Not selected:    Within the last 24 hours    7 to 14 days ago    15 to 30 days ago    More than 1 month ago   What type of COVID-19 test did you most recently have? Select one.    Viral test at a doctor's office, lab, or testing site   Not selected:    Viral self-test or home test   What was the result of your most recent COVID-19 test? Select one.    Negative   Not selected:    Positive   Even though your last test was negative, you could still have COVID. You may have tested before the virus was detectable. In some cases, repeat testing over several days is needed. - If you have a COVID test kit, take the test now before continuing this interview. - If you choose not to take a test or don't have one, you should still continue this interview. Your provider can still help you get care. Do you have a COVID test kit? Select one.    Yes, but I prefer not to take a test now   Not selected:    Yes, and I'll take another test now    No, I don't have a test kit   Has anyone in your household tested positive for COVID-19 in the past 14 days? Select one.    No   Not selected:    Yes   Have you gotten the COVID-19 vaccine? Select one.    Yes   Not selected:    No   How many total doses of the COVID-19 vaccine have you gotten? This includes boosters as well as additional doses for those who are immunocompromised. Select one.    3 doses   Not selected:    1 dose    2 doses    4 doses    5 doses    6 doses   1st dose    Moderna   Not selected:    J&J/Afshan    Novavax    Pfizer   2nd dose    Moderna   Not selected:    J&J/Afshan    Novavax    Pfizer   3rd dose    Moderna   Not selected:    J&J/Afshan    Novavax    Pfizer   When did you get your most recent dose of the COVID-19 vaccine?    More than 14 days ago   Not selected:    Less than 48 hours (2 days) ago    48 to 72 hours (3 days) ago    3 to 5 days ago    5 to 7 days ago    7 to 14 days ago   In the last 14 days, have  you traveled outside of your local community? This includes travel by car, RV, bus, train, or plane. Travel increases your chances of getting and spreading COVID-19. Select one.    No   Not selected:    Yes   You mentioned having a fever. Do you have a fever now? Select one.    Yes, and I've had one since my symptoms started   Not selected:    Yes, but I didn't have one when my symptoms started    No, it's gone now   Did you take your temperature with a thermometer? Select one.    Yes   Not selected:    No, but it felt mild    No, but it felt high   What was the highest reading on the thermometer? Select one.    Below 100.4F   Not selected:    100.4 to 101.5F    101.6 to 101.9F    102.0 to 103.0F    Above 103.0F   How long have you had a fever? Select one.    1 to 3 days   Not selected:    Less than 24 hours    4 or more days   You mentioned having a headache. On a scale of 1 to 10, how severe is your headache pain? Select one.    Moderate (4 to 6)   Not selected:    Mild (1 to 3)    Severe (7 to 9)    Unbearable (10)    The worst headache of my life (10+)   Do you cough so hard that it's made you gag or vomit? By gag, we mean has your coughing made you choke or dry heave? Select all that apply.    Yes, my coughing has made me gag   Not selected:    Yes, my coughing has made me vomit    No   When is your cough the worst? Select all that apply.    In the morning, or when I wake up   Not selected:    During the day    At nighttime, or while I'm sleeping    I haven't noticed a difference depending on time of day   Are you coughing up mucus or phlegm? Select one.    Yes, a lot   Not selected:    No, my cough is dry    Yes, a little   What color is most of the mucus or phlegm that you're coughing up? Select one.    Clear   Not selected:    White/frothy    Yellow or yellowish    Green or greenish    Red or pink    I'm not sure   Do you feel sinus pain or pressure in any of these areas?    No   Not selected:    In my  "forehead    Around my eyes    Behind my nose    In my cheeks    In my upper teeth or jaw   What color is your nasal drainage? Select one.    Clear   Not selected:    White    Yellow or yellowish    Green or greenish    My nose is stuffed but not draining or running   Is your nasal drainage thick or thin? Select one.    Thick   Not selected:    Thin   Is there any drainage (mucus) going down the back of your throat? This kind of drainage is also called \"postnasal drip.\" It can cause frequent throat clearing. Select one.    Yes   Not selected:    No, not that I know of   Can you swallow liquids and solid foods? A sore throat may be painful when swallowing, but it shouldn't prevent you from swallowing. Select one.    Yes, but it's painful   Not selected:    Yes, with ease    Yes, but it's uncomfortable    It's hard to swallow anything because it feels like liquids and food get stuck in my throat    No, I can't swallow anything, liquid or solid foods   Is your throat pain worse on one side than the other? Select one.    No, it's the same on both sides   Not selected:    Yes, it's worse on the right side    Yes, it's worse on the left side   Since your symptoms started, have you felt dizzy? Select one.    No   Not selected:    Yes, but I can continue with my regular daily activities    Yes, and it makes it hard to stand, walk, or do daily activities   Do you have chest pain? You might also feel it as discomfort, aching, tightness, or squeezing in the chest. Select one.    Yes   Not selected:    No   Which of these is true of your chest pain? Select one.    My chest hurts only when I cough   Not selected:    My chest hurts even when I'm not coughing   Have you urinated at least 3 times in the last 24 hours? Select one.    Yes   Not selected:    No   Changes in alertness or awareness may mean you need emergency care. Since your symptoms started, have you had any of these? Select all that apply.    None of the above   Not " "selected:    Confusion    Slurred speech    Not knowing where you are or what day it is    Difficulty staying conscious    Fainting or passing out   Do your symptoms include a whistling sound, or wheezing, when you breathe? Select one.    Yes   Not selected:    No   Early in this interview, you told us you were hoarse or you'd lost your voice. How would you describe the changes to your voice? Select one.    It just sounds a little raspy   Not selected:    It's harder than usual to talk    I can barely talk at all   Is it possible that you strained your voice? Singing, yelling, or talking more or louder than usual can cause voice strain. Select one.    No, not that I know of   Not selected:    Yes   Do you have any of these symptoms in your ear(s)? Select all that apply.    None of the above   Not selected:    Pain    Pressure    Fullness    Crackling or popping    Plugged or blocked sensation   Can you move your chin toward your chest?    Yes   Not selected:    No, my neck is too stiff   Try opening your mouth as wide as you can. Are you able to open your mouth all the way as you normally would? Select one.    Yes   Not selected:    No   Does your voice sound muffled? \"Muffled\" here does not mean hoarse or scratchy. By \"muffled,\" we mean that it sounds like you're speaking with a mouth full of hot food. Select one.    No, not that I can tell   Not selected:    Yes   Are your tonsils larger than usual?    No, not that I can tell   Not selected:    Yes    I've had my tonsils removed   Is there any white or yellow pus on your tonsils?    No, not that I can see   Not selected:    Yes   Are there red spots on the roof of your mouth or the back of your throat?    No, not that I can see   Not selected:    Yes   Are your glands/lymph nodes swollen, or does it hurt when you touch them?    No, not that I can tell   Not selected:    Yes   In the past week, has anyone around you (such as at school, work, or home) had a " confirmed diagnosis of the flu? A confirmed diagnosis means that a nose swab was done to verify a flu infection. Select all that apply.    No, not that I know of   Not selected:    I live with someone who has the flu    I've been within touching distance of someone who has the flu    I've walked by, or sat about 3 feet away from, someone who has the flu    I've been in the same building as someone who has the flu   Have you ever been diagnosed with asthma? Select one.    No   Not selected:    Yes   Have you ever been prescribed albuterol to use for wheezing, cough, or shortness of breath caused by a cold, bronchitis, or pneumonia? Albuterol (ProAir, Proventil, Ventolin) is prescribed as an inhaler or a solution to be used with a nebulizer machine. Select one.    No, not that I know of   Not selected:    Yes   Have you ever been prescribed a steroid inhaler to use for wheezing, cough, or shortness of breath caused by a cold, bronchitis, or pneumonia? Some examples of steroid inhalers include Pulmicort, Flovent, Qvar, and Alvesco. Select one.    No, not that I know of   Not selected:    Yes   Have you ever been diagnosed with chronic obstructive pulmonary disease (COPD)? Select one.    No, not that I know of   Not selected:    Yes   In the past month, have you taken antibiotics for similar symptoms? Examples of antibiotics include amoxicillin, amoxicillin-clavulanate (Augmentin), penicillin, cefdinir (Omnicef), doxycycline, and clindamycin (Cleocin). Select one.    No   Not selected:    Yes (specify)   Do you have allergies (pollen, dust mites, mold, animal dander)? Select one.    Yes   Not selected:    No, not that I know of   What kind of allergies do you have? Select all that apply.    Perennial, or year-round, allergies (hay fever)   Not selected:    Seasonal allergies (hay fever)    Pet allergies    Dust allergies    None of the above    I'm not sure   Do you think your symptoms could be allergy-related? Select  one.    No   Not selected:    Yes    I'm not sure   Have you had a flu shot this season? Select one.    No   Not selected:    Yes, less than 2 weeks ago    Yes, 2 to 4 weeks ago    Yes, 1 to 3 months ago    Yes, 3 to 6 months ago    Yes, more than 6 months ago   The flu and COVID-19 can be more serious for people in certain groups. The next few questions help us figure out if you or anyone you live with is at higher risk for complications from these infections. Do any of these statements apply to you? Select all that apply.    None of the above   Not selected:    I'm     I'm     I'm Black    I'm  or    Do you smoke tobacco? Select one.    No   Not selected:    Yes, every day    Yes, some days    No, I quit   Do you have any of these conditions? Select all that apply.    None of the above   Not selected:    Chronic lung disease, such as cystic fibrosis or interstitial fibrosis    Heart disease, such as congenital heart disease, congestive heart failure, or coronary artery disease    Disorder of the brain, spinal cord, or nerves and muscles, such as dementia, cerebral palsy, epilepsy, muscular dystrophy, or developmental delay    Metabolic disorder or mitochondrial disease    Cerebrovascular disease, such as stroke or another condition affecting the blood vessels or blood supply to the brain    Down syndrome    Mood disorder, including depression or schizophrenia spectrum disorders    Substance use disorder, such as alcohol, opioid, or cocaine use disorder    Tuberculosis   Do you live in a group care setting? Examples include: - Nursing home - Residential care - Psychiatric treatment facility - Group home - Dormitory - Board and care home - Homeless shelter - Foster care setting Select one.    No   Not selected:    Yes   Are you a healthcare worker? Select one.    No   Not selected:    Yes   People with a very high body mass index (BMI) are at higher risk for developing  complications from the flu and severe illness from COVID-19. To determine your BMI, we need to know your weight and height. Please enter your weight (in pounds).    Weight   Please enter your height.    Height   Do you have any of these conditions that can affect the immune system? Scroll to see all options. Select all that apply.    Moderate to severe plaque psoriasis   Not selected:    History of bone marrow transplant    Chronic kidney disease    Chronic liver disease (including cirrhosis)    HIV/AIDS    Inflammatory bowel disease (Crohn's disease or ulcerative colitis)    Lupus    Multiple sclerosis    Rheumatoid arthritis    Sickle cell anemia    Alpha or beta thalassemia    History of solid organ transplant (kidney, liver, or heart)    History of spleen removal    An autoimmune disorder not listed here (specify)    A condition requiring treatment with long-term use of oral steroids (such as prednisone, prednisolone, or dexamethasone) (specify)    None of these   Do you take medications for your condition? This includes oral and injectable medications that are taken daily, weekly, or monthly. Select one.    No   Not selected:    Yes, regularly    Yes, for flare-ups only   Have you ever been diagnosed with cancer? Select one.    No   Not selected:    Yes, I have cancer now    Yes, but I'm in remission   Do any of these apply to you? Select all that apply.    I have diabetes   Not selected:    I've been hospitalized within the last 5 days    I'm in close contact with a child in     None of the above   The flu and COVID-19 can be more serious for people in certain groups. Do any of these apply to the people who live with you? Select all that apply.    None of the above   Not selected:    Under age 5    Over age 65            Black     or     Pregnant    Has given birth, had a miscarriage, had a pregnancy loss, or had an  in the last 2 weeks   Does any member  of your household have any of these medical conditions? Select all that apply.    Diabetes   Not selected:    Asthma    Disorders of the brain, spinal cord, or nerves and muscles, such as dementia, cerebral palsy, epilepsy, muscular dystrophy, or developmental delay    Chronic lung disease, such as COPD or cystic fibrosis    Heart disease, such as congenital heart disease, congestive heart failure, or coronary artery disease    Cerebrovascular disease, such as stroke or another condition affecting the blood vessels or blood supply to the brain    Blood disorders, such as sickle cell disease    Metabolic disorders such as inherited metabolic disorders or mitochondrial disease    Kidney disorders    Liver disorders    Weakened immune system due to illness or medications such as chemotherapy or steroids    Children under the age of 19 who are on long-term aspirin therapy    Extreme obesity (BMI > 40)    None of the above   Do you have any of these conditions? Scroll to see all options. Select all that apply.    High blood pressure   Not selected:    Aspirin triad (also known as Samter's triad or ASA triad)    Asthma or hives from taking aspirin or other NSAIDs, such as ibuprofen or naproxen    Blockage or narrowing of the blood vessels of the heart    Blood clotting disorder    Blood dyscrasia, such anemia, leukemia, lymphoma, or myeloma    Bone marrow depression    Catecholamine-releasing paraganglioma    Congenital long QT syndrome    Depression    Difficulty urinating or completely emptying your bladder    Uncorrected electrolyte abnormalities    Fungal infection    Gastrointestinal (GI) bleeding    Gastrointestinal (GI) obstruction    G6PD deficiency    Recent heart attack    Irregular heartbeat or heart rhythm    Mononucleosis (mono)    Myasthenia gravis    Parkinson's disease    Pheochromocytoma    Reye syndrome    Seizure disorder    Thyroid disease    Ulcerative colitis    None of the above   Have you ever had  either of these conditions? Select all that apply.    No   Not selected:    Metoclopramide-associated dystonic reaction    Tardive dyskinesia   Just a few more questions about medications, and then you're finished. Have you used any non-prescription medications or nasal sprays for your current symptoms? Examples include saline sprays, decongestants, NyQuil, and Tylenol. Select one.    Yes   Not selected:    No   Which of these non-prescription medications have you tried? Scroll to see all options. Select all that apply.    Acetaminophen (Tylenol)    Guaifenesin (Mucinex)    Ibuprofen (Advil, Motrin, Midol)    Oxymetazoline (Afrin)   Not selected:    Budesonide (Rhinocort)    Cetirizine (Zyrtec)    Chlorpheniramine (Aller-chlor, Chlor-Trimeton)    Cromolyn (NasalCrom)    Dextromethorphan (Delsym, Robitussin, Vicks DayQuil Cough)    Diphenhydramine (Benadryl)    Fexofenadine (Allegra)    Fluticasone (Flonase)    Guaifenesin/dextromethorphan (Delsym DM, Mucinex DM, Robitussin DM)    Ketotifen (Alaway, Zaditor)    Loratadine (Alavert, Claritin)    Naphazoline-pheniramine (Naphcon-A, Opcon-A, Visine-A)    Omeprazole (Prilosec)    Phenylephrine (Sudafed PE)    Triamcinolone (Nasacort)    None of the above   Have you taken any monoamine oxidase inhibitor (MAOI) medications in the last 14 days? Examples include rasagiline (Azilect), selegiline (Eldepryl, Zelapar), isocarboxazid (Marplan), phenelzine (Nardil), and tranylcypromine (Parnate). Select one.    No, not that I know of   Not selected:    Yes   Do you take Kynmobi or Apokyn (apomorphine)? Select one.    No   Not selected:    Yes   Are you still taking these medications listed in your medical record? If you're not taking any of these, click Next. Select all that apply.    furosemide 40 MG tablet    spironolactone 50 MG tablet    multivitamin with minerals tablet tablet    aspirin 81 MG chewable tablet    metFORMIN 1000 MG tablet    simvastatin 20 MG tablet     amLODIPine 10 MG tablet    metoprolol tartrate 50 MG tablet    escitalopram 20 MG tablet    glimepiride 4 MG tablet    benazepril 40 MG tablet   Are you taking any other medications, vitamins, or supplements? Select one.    No   Not selected:    Yes   Have you ever had an allergic or bad reaction to any medication? Select one.    No   Not selected:    Yes   Are you allergic to milk or to the proteins found in milk (for example, whey or casein)? A milk allergy is different from lactose intolerance. Select one.    No, not that I know of   Not selected:    Yes   Have you ever had jaundice or liver problems as a result of taking amoxicillin-clavulanate (Augmentin)? Jaundice is a condition in which the skin and the whites of the eyes turn yellow. Select all that apply.    No, not that I know of   Not selected:    Yes, jaundice    Yes, liver problems   Have you ever had jaundice or liver problems as a result of taking azithromycin (Zithromax, Zmax)? Jaundice is a condition in which the skin and the whites of the eyes turn yellow. Select all that apply.    No, not that I know of   Not selected:    Yes, jaundice    Yes, liver problems   Do you need a doctor's note? A doctor's note confirms that you received care today and states when you can return to school or work. It does not contain information about your diagnosis or treatment plan. Your provider will make the final decision on whether to give you a doctor's note and for how long. Doctor's notes CANNOT be backdated. We can't provide medical leave paperwork through this type of visit. If more paperwork is needed to request time off, contact your primary care provider. Select one.    3 days   Not selected:    Today only (1 day)    Today and tomorrow (2 days)    5 days    7 days    10 days    14 days    No   Is there anything you'd like to add about your symptoms? Please limit your comments to the symptoms asked about in this interview. If you include comments about  other concerns, your provider may recommend that you be seen in person.   The patient did not enter any additional information.   ----------   Medical history   Medical history data does not currently exist for this patient.

## 2023-12-06 RX ORDER — METOPROLOL TARTRATE 50 MG/1
50 TABLET, FILM COATED ORAL 2 TIMES DAILY
Qty: 180 TABLET | Refills: 2 | Status: SHIPPED | OUTPATIENT
Start: 2023-12-06

## 2024-03-06 RX ORDER — SPIRONOLACTONE 50 MG/1
50 TABLET, FILM COATED ORAL DAILY
Qty: 90 TABLET | Refills: 2 | Status: SHIPPED | OUTPATIENT
Start: 2024-03-06

## 2024-03-20 RX ORDER — GLIMEPIRIDE 4 MG/1
4 TABLET ORAL 2 TIMES DAILY WITH MEALS
Qty: 60 TABLET | Refills: 11 | OUTPATIENT
Start: 2024-03-20

## 2024-03-20 NOTE — TELEPHONE ENCOUNTER
Rx Refill Note  Requested Prescriptions     Pending Prescriptions Disp Refills    glimepiride (AMARYL) 4 MG tablet [Pharmacy Med Name: GLIMEPIRIDE 4 MG TABLET] 60 tablet 11     Sig: TAKE ONE TABLET BY MOUTH TWICE A DAY WITH MEALS    metFORMIN (GLUCOPHAGE) 1000 MG tablet [Pharmacy Med Name: metFORMIN HCL 1,000 MG TABLET] 60 tablet 11     Sig: TAKE ONE TABLET BY MOUTH TWICE A DAY WITH MEALS      Last office visit with prescribing clinician: 2/20/2023   Last telemedicine visit with prescribing clinician: Visit date not found   Next office visit with prescribing clinician: Visit date not found                         Would you like a call back once the refill request has been completed: [] Yes [] No    If the office needs to give you a call back, can they leave a voicemail: [] Yes [] No    Lucille Thakur MA  03/20/24, 07:55 EDT

## 2024-03-22 RX ORDER — SIMVASTATIN 20 MG
20 TABLET ORAL NIGHTLY
Qty: 90 TABLET | Refills: 1 | OUTPATIENT
Start: 2024-03-22

## 2024-05-24 ENCOUNTER — TELEPHONE (OUTPATIENT)
Dept: GASTROENTEROLOGY | Facility: CLINIC | Age: 49
End: 2024-05-24

## 2024-05-24 NOTE — TELEPHONE ENCOUNTER
"Caller: Gerry Thomas \"NATHANIEL\"    Relationship to patient: Self    Best call back number: [phone]     Patient is needing:   PATIENT HAS A CASE REQUEST IN FOR A SCREEN FOR COLON CANCER AND WOULD LIKE TO SCHEDULE.  PLEASE CALL BACK TO SCHEDULE AND/OR ADVISE.  THANK YOU.    "

## 2024-05-26 ENCOUNTER — E-VISIT (OUTPATIENT)
Dept: FAMILY MEDICINE CLINIC | Facility: TELEHEALTH | Age: 49
End: 2024-05-26

## 2024-05-26 DIAGNOSIS — F32.0 CURRENT MILD EPISODE OF MAJOR DEPRESSIVE DISORDER, UNSPECIFIED WHETHER RECURRENT: Primary | ICD-10-CM

## 2024-05-26 NOTE — E-VISIT TREATED
Chief Complaint: Anxiety, Depression, Stress   Patient introduction   Patient is 48-year-old male presenting with mood symptoms. Patient has had current symptoms for less than a year. Has had recent unusual stress relating to personal relationships, work, and current news and events.   Depression screening   PHQ-9. Response options are: Not at all (0), On several days (1), More than half the days (2), or Nearly every day (3).   Over the past 2 weeks, patient has been bothered:    (1) On several days by having little interest or pleasure in doing things    (2) On more than half the days by depressed mood    (1) On several days by sleep disturbance    (1) On several days by fatigue or lethargy    (1) On several days by change in appetite    (2) On more than half the days by feelings of worthlessness or excessive guilt    (1) On several days by poor concentration    (0) Not at all by observable restlessness or slowness in movement    (1) On several days by thoughts of hurting themselves or that they would be better off dead   The above problems have made it somewhat difficult to work, function at home, or get along with other people.   Score: 10. Interpretation: 0 to 4: None to minimal. 5 to 9: Mild depression. 10 to 14: Major Depressive Disorder, Mild. 15 to 19: Major Depressive Disorder, Moderately Severe. 20 to 27: Major Depressive Disorder, Severe.   Anxiety screening   LISSETTE-7. Response options are: Not at all (0), On several days (1), More than half the days (2), or Nearly every day (3)   Over the past 2 weeks, patient has been bothered:    (2) On more than half the days by feeling nervous, anxious, or on edge    (2) On more than half the days by not being able to stop or control worrying    (2) On more than half the days by worrying too much about different things    (2) On more than half the days by having trouble relaxing    (1) On several days by being so restless that it is hard to sit still    (3) Nearly  every day by becoming easily annoyed or irritable    (1) On several days by feeling afraid, as if something awful might happen   The above problems have made it somewhat difficult to work, function at home, or get along with other people.   Score: 13. Interpretation: 0 to 4: None to minimal. 5 to 9: Mild anxiety. 10 to 14: Moderate anxiety. 15 to 21: Severe anxiety.   Suicide risk screening   Score: Negative screen (based on PHQ-9 responses above).   Action taken based on risk:    Negative screen: Patient completed interview.    Low risk: Patient completed interview. Follow-up per provider discretion.    Moderate risk: Recommended to call [phone] or [phone] or to go to their nearest ER. Patient given option to continue with the interview if those options are not relevant at this time. Follow-up per provider discretion.    High risk: Interview terminated. Recommended to go to ER or to call [phone] or [phone].   Repetitive thoughts and behaviors screening   DSM-5 Level 1 Cross-Cutting Symptom Measure, Section X. 2 items. Response options are: Not at all (0), Rarely (1), Several days (2), More than half the days (3), or Nearly every day (4)   Over the past 2 weeks, patient has been bothered:    (1) On 1 to 2 days by unpleasant thoughts, urges, or images that repeatedly enter their mind    (0) Not at all by feeling driven to repeat certain behaviors or mental acts   Score: 1. Interpretation: 0 to 2 (with 0 to 1 on both items): Negative screen. 2 or higher (with 2 or higher on either item): Positive screen.   Veronica/hypomania screening   DSM-5 Level 1 Cross-Cutting Symptom Measure, Section III. 2 items. Response options are: Not at all (0), Rarely (1), Several days (2), More than half the days (3), or Nearly every day (4)   Over the past 2 weeks, patient has been bothered:    (1) On 1 to 2 days by sleeping less than usual, but still having a lot of energy    (0) Not at all by starting lots more projects than usual or doing more risky  things than usual   Score: 1. Interpretation: 0 to 2 (with 1 on both items): Negative screen. 2 or higher (with 2 or higher on at least 1 item): Positive screen; in-interview follow-up with Juju Self-Rating Veronica (ASRM) Scale.   Psychosis/hallucination screening   DSM-5 Level 1 Cross-Cutting Symptom Measure, Section VII. 2 items. Response options are: Not at all (0), Rarely (1), Several days (2), More than half the days (3), or Nearly every day (4)   Over the past 2 weeks, patient has been bothered:    (0) Not at all by hearing things other people could not hear    (0) Not at all by feeling that someone could hear their thoughts   Score: 0. Interpretation: 0: Negative screen. 1 or higher: Positive screen.   Substance abuse screening   DSM-5 Level 1 Cross-Cutting Symptom Measure, Section XIII. 2 items on use of tobacco, recreational drugs, or prescription medications beyond the amount prescribed or duration of prescription.   Over the past 2 weeks, patient:    (0) Did not use tobacco    (0) Did not use a recreational or prescription drug on their own   Score: 0. Interpretation: 0 is a negative screen. 1 or higher with positive response for prescription/recreational drug abuse leads to follow-up with Level 2 Cross-Cutting Symptom Measure, Section XIII. 1 or higher with positive response for tobacco use leads to tobacco cessation advice in AVS.   AUDIT-C. 3 items, shown if alcohol use reported above.   During the past year, patient:    (1) Had an alcoholic drink monthly or less    (0) Had 1 to 2 alcoholic drinks on a typical day when they were drinking    (0) Did not have 6 or more drinks on one occasion   Score: 1. Interpretation: A score of 4 or higher in men is a positive screen for unhealthy drinking.   Past mental health history   Previous diagnosis of depression, generalized anxiety disorder, and panic attacks. Regarding month and year of first depression diagnosis, patient writes: During college. 2000. Since  "initial depression diagnosis, patient has not had a period when symptoms resolved and they did not need medication.   Family history of mental health disorders   First-degree relative(s) with a history of generalized anxiety disorder and panic attacks. Regarding medication taken by first-degree relative(s), patient writes: My sister has much more severe mental health issues than I do. She has taken a variety of different things that have not been very effective. I have only used Lexapro and it was very effective for me...but I have been off it for a couple of months..   Current mental health treatment   Patient is not currently taking medication for any mental health condition. Patient is not currently in counseling or therapy. Patient is not currently being seen by a psychiatrist and has not been seen by one in the last 2 years.   Previous mental health treatment   Has taken escitalopram in the past.   As to effectiveness of past treatment:   Patient was satisfied with escitalopram. Patient wants to refill escitalopram.   Vital signs    Height: 6' 3\"    Weight: 400 lbs   Current medications   Currently taking furosemide 40 MG tablet, multivitamin with minerals tablet tablet, aspirin 81 MG chewable tablet, metFORMIN 1000 MG tablet, simvastatin 20 MG tablet, amLODIPine 10 MG tablet, spironolactone 50 MG tablet, metoprolol tartrate 50 MG tablet, glimepiride 4 MG tablet, and benazepril 40 MG tablet.   Medication allergies   None.   Medication contraindications   Past medical history   History of diabetes and hypertension.   Patient-submitted comments   Patient was asked if they had anything to add about their symptoms. Patient writes: I have used Lexapro for years. I decided to stop when I ran out 2 months ago. It has not worked for me. I am depressed and the symptoms of my anxiety have been very difficult. I would like to restart..   Patient is willing to try medication as part of their treatment plan.   Patient did " not request an excuse note.   Assessment   Major depressive disorder, single, mild.   This diagnosis is based on review of patient interview responses and other available clinical information.    PHQ-9 depression screening score: 10. Interpretation: 10 to 14: Major Depressive Disorder, Mild.    LISSETTE-7 generalized anxiety screening score: 13. Interpretation: 10 to 14: Moderate anxiety.   Suicide risk severity screening was negative.   Screening results show low likelihood of vandana/hypomania and psychosis.   Plan   Medications:    escitalopram 5 mg tablet RX 5mg 1 tab PO qd 30d for mood disorder. Amount is 30 tab. Refill amount is 2.   The patient's prescription will be sent to:   [facility] [id number]   [address] [place] [address]   Phone: [phone]     Fax: [phone]   Orders:    Referral to behavioral health.   Education:    Condition and causes    Treatment and self-care    Possible medication side effects    When to call provider   ----------   Electronically signed by KAREN Frias FNP-BC on 2024-05-26 at 00:06AM   ----------   Patient Interview Transcript:   Have you ever been diagnosed with any of these mental health conditions? Select all that apply.    Depression    Generalized anxiety disorder (LISSETTE)    Panic attacks   Not selected:    Post traumatic stress disorder (PTSD)    Obsessive-compulsive disorder (OCD)    Bipolar disorder    Schizophrenia or schizoaffective disorder    A mental health condition not listed here (specify)    None of the above   When were you first diagnosed with depression? Please specify the month and year, or your best estimate, as MM/YYYY.    During college. 2000   Since you were first diagnosed with depression, has there been a time when your symptoms went away completely and you didn't need to take medication? Select one.    No   Not selected:    Yes   Are you currently taking medication for any mental health condition? Select one.    No   Not  selected:    Yes   Have you taken medication for any mental health condition in the past? Select one.    Yes   Not selected:    No   Which medications have you taken in the past for your mental health condition(s)? Select all that apply.    Lexapro (escitalopram)   Not selected:    Atarax or Vistaril (hydroxyzine)    BuSpar (buspirone)    Celexa (citalopram)    Cymbalta or Drizalma Sprinkle (duloxetine)    Effexor or Effexor XR (venlafaxine)    Paxil, Paxil CR, or Pexeva (paroxetine)    Pristiq (desvenlafaxine)    Prozac (fluoxetine)    Remeron (mirtazapine)    Trazodone    Wellbutrin SR, Wellbutrin XL, Forfivo XL, or Aplenzin (bupropion)    Zoloft (sertraline)    Other (specify medication and whether you were satisfied with it)   I'm satisfied with Lexapro (escitalopram)    Yes   Not selected:    No   I want to refill/restart    Yes   Not selected:    No   Have you recently experienced unusual stress from any of these? Select all that apply.    Personal relationships    Work    Current news and events   Not selected:    Home situation    Family    Finances    None of the above   Are you currently in counseling or therapy? Select one.    No   Not selected:    Yes   Are you currently being seen by a psychiatrist, or have you been seen by a psychiatrist in the last 2 years? Select one.    No   Not selected:    Yes, currently    Yes, within the last 2 years   Do you have any of these medical conditions? Scroll to see all options. Select all that apply.    Diabetes    High blood pressure   Not selected:    Asthma    Cancer    Chronic pain    Congestive heart failure    Coronary artery disease (blocked arteries in the heart)    Epilepsy    Inflammatory arthritis    Kidney disease or history of kidney function problems    Lupus (SLE)    Multiple sclerosis    Parkinson disease    Thyroid disorder    Viral hepatitis    None of the above   Do any of these apply to your first-degree blood relatives? First-degree blood  relatives include parents, siblings, and children who you're related to by birth, not by marriage or adoption. Select all that apply.    Generalized anxiety disorder (LISSETTE)    Panic attacks   Not selected:    Depression    Post traumatic stress disorder (PTSD)    Obsessive-compulsive disorder (OCD)    Bipolar disorder    Schizophrenia or schizoaffective disorder    Drug or alcohol addiction (substance use disorder)     by suicide    Attempted suicide    No, not that I know of   Are any of your first-degree blood relatives taking medications for their condition? Select one.    Yes   Not selected:    No, not that I know of   Genetics often play a role in how well medications work for mental health conditions. For example, if your sister with depression did well on sertraline (Zoloft), then it's likely that sertraline would work well for you, too. If you know the name of the medication your family member takes for their mental health condition, list it here. If not, click Next.    My sister has much more severe mental health issues than I do. She has taken a variety of different things that have not been very effective. I have only used Lexapro and it was very effective for me...but I have been off it for a couple of months.   1. Over the past 2 weeks, how often have you been bothered by: Having little interest or pleasure in doing things Select one.    Several days   Not selected:    Not at all    More than half the days    Nearly every day   2. Over the past 2 weeks, how often have you been bothered by: Feeling down, depressed, or hopeless Select one.    More than half the days   Not selected:    Not at all    Several days    Nearly every day   3. Over the past 2 weeks, how often have you been bothered by: Trouble falling or staying asleep, or sleeping too much Select one.    Several days   Not selected:    Not at all    More than half the days    Nearly every day   4. Over the past 2 weeks, how often have you  been bothered by: Feeling tired or having little energy Select one.    Several days   Not selected:    Not at all    More than half the days    Nearly every day   5. Over the past 2 weeks, how often have you been bothered by: Poor appetite or overeating Select one.    Several days   Not selected:    Not at all    More than half the days    Nearly every day   6. Over the past 2 weeks, how often have you been bothered by: Feeling bad about yourself, that you're a failure, or that you've let yourself or friends and family down Select one.    More than half the days   Not selected:    Not at all    Several days    Nearly every day   7. Over the past 2 weeks, how often have you been bothered by: Trouble concentrating on things like watching TV or reading the news Select one.    Several days   Not selected:    Not at all    More than half the days    Nearly every day   8. Over the past 2 weeks, how often have you been bothered by: Moving or speaking so slowly that other people could have noticed OR Being so fidgety or restless that you have been moving around a lot more than usual Select one.    Not at all   Not selected:    Several days    More than half the days    Nearly every day   9. Over the past 2 weeks, how often have you been bothered by: Thoughts that you'd be better off dead or thoughts of hurting yourself Select one.    Several days   Not selected:    Not at all    More than half the days    Nearly every day   How difficult have these problems made it for you to work, take care of things at home, or get along with other people? Select one.    Somewhat difficult   Not selected:    Not difficult at all    Very difficult    Extremely difficult   1. Over the past 2 weeks, how often have you been bothered by: Feeling nervous, anxious, or on edge? Select one.    More than half the days   Not selected:    Not at all    Several days    Nearly every day   2. Over the past 2 weeks, how often have you been bothered by:  Not being able to stop or control worrying? Select one.    More than half the days   Not selected:    Not at all    Several days    Nearly every day   3. Over the past 2 weeks, how often have you been bothered by: Worrying too much about different things? Select one.    More than half the days   Not selected:    Not at all    Several days    Nearly every day   4. Over the past 2 weeks, how often have you been bothered by: Having trouble relaxing? Select one.    More than half the days   Not selected:    Not at all    Several days    Nearly every day   5. Over the past 2 weeks, how often have you been bothered by: Being so restless that it's hard to sit still? Select one.    Several days   Not selected:    Not at all    More than half the days    Nearly every day   6. Over the past 2 weeks, how often have you been bothered by: Becoming easily annoyed or irritable? Select one.    Nearly every day   Not selected:    Not at all    Several days    More than half the days   7. Over the past 2 weeks, how often have you been bothered by: Feeling afraid, as if something awful might happen? Select one.    Several days   Not selected:    Not at all    More than half the days    Nearly every day   How difficult have these symptoms made it for you to do your work, take care of things at home, or get along with other people? Select one.    Somewhat difficult   Not selected:    Not difficult at all    Very difficult    Extremely difficult   Over the past 2 weeks, how often have you been bothered by: Sleeping less than usual, but still having a lot of energy? Select one.    1 to 2 days   Not selected:    Not at all    Several days    More than half the days    Nearly every day   Over the past 2 weeks, how often have you been bothered by: Starting lots more projects than usual or doing more risky things than usual? Select one.    Not at all   Not selected:    1 to 2 days    Several days    More than half the days    Nearly every  day   Over the past 2 weeks, how often have you been bothered by: Hearing things other people couldn't hear, such as voices even when no one was around? Select one.    Not at all   Not selected:    1 to 2 days    Several days    More than half the days    Nearly every day   Over the past 2 weeks, how often have you been bothered by: Feeling that someone could hear your thoughts, or that you could hear what another person was thinking? Select one.    Not at all   Not selected:    1 to 2 days    Several days    More than half the days    Nearly every day   Over the past 2 weeks, how often have you been bothered by: Unpleasant thoughts, urges, or images that repeatedly enter your mind? Select one.    1 to 2 days   Not selected:    Not at all    Several days    More than half the days    Nearly every day   Over the past 2 weeks, how often have you been bothered by: Feeling driven to perform certain behaviors or mental acts over and over again? Select one.    Not at all   Not selected:    1 to 2 days    Several days    More than half the days    Nearly every day   In the past year, how often did you have a drink containing alcohol? Select one.    Monthly or less   Not selected:    Never    _2 to 4 times per month _    2 to 3 times per week    4 or more times per week   In the past year, how many drinks containing alcohol did you have on a typical day when you were drinking? Select one.    1 or 2   Not selected:    3 or 4    5 or 6    7 to 9    10 or more   In the past year, how often did you have 6 or more drinks on one occasion? Select one.    Never   Not selected:    Less than monthly    Monthly    Weekly    Daily or almost daily   Over the past 2 weeks, how often have you: Smoked any cigarettes, smoked a cigar or pipe, or used snuff or chewing tobacco? Select one.    Not at all   Not selected:    1 to 2 days    Several days    More than half the days    Nearly every day   Over the past 2 weeks, how often did you use  "any of these on your own? \"On your own\" means without a doctor's prescription, or more than prescribed, or longer than prescribed. - Prescription painkillers, such as Vicodin - Stimulants, such as Ritalin or Adderall - Sedatives or tranquilizers, such as sleeping pills or Valium - Marijuana - Cocaine or crack - Club drugs, such as Ecstasy - Hallucinogens, such as LSD - Heroin - Inhalants or solvents, such as glue - Methamphetamines, such as speed Select one.    Not at all   Not selected:    1 to 2 days    Several days    More than half the days    Nearly every day   Think about all of the symptoms you've shared with us today. How long have you been feeling this way? Select one.    Less than a year   Not selected:    More than a year    I'm not sure   These last few questions help us make sure your treatment plan is safe for you. Do you have any of these conditions? Select all that apply.    None of these   Not selected:    Uncorrected or persistent electrolyte abnormalities, such as potassium, sodium, calcium or magnesium    QT prolongation    Congenital long QT syndrome (LQTS)    Ventricular arrhythmias, such as ventricular fibrillation or ventricular tachycardia    Bradycardia (low heart rate)    Recent heart attack    Congestive heart failure (CHF)   Do any of these apply to you now or in the recent past? \"Cold turkey\" here means stopping a medication suddenly rather than slowly taking lower and lower doses until you're off the medication. Select all that apply.    None of these   Not selected:    Seizure disorder    Bulimia or anorexia    Liver disease    Stopped using alcohol \"cold turkey\"    Stopped using a sedative \"cold turkey\"    Stopped using an anti-seizure drug \"cold turkey\"    Stopped using a benzodiazepine drug (Klonopin, Valium, Ativan, Xanax) \"cold turkey\"   Do any of the following apply to you? Select all that apply.    None of these   Not selected:    I'm currently taking pimozide    I'm currently " taking thioridazine    I've taken an MAO inhibitor in the last 14 days    I've taken linezolid or IV methylene blue in the last 14 days   Are you still taking these medications listed in your medical record? If you're not taking any of these, click Next. Select all that apply.    furosemide 40 MG tablet    multivitamin with minerals tablet tablet    aspirin 81 MG chewable tablet    metFORMIN 1000 MG tablet    simvastatin 20 MG tablet    amLODIPine 10 MG tablet    spironolactone 50 MG tablet    metoprolol tartrate 50 MG tablet    glimepiride 4 MG tablet    benazepril 40 MG tablet   Are you taking any other medications, vitamins, or supplements? Select one.    No   Not selected:    Yes   Have you ever had an allergic or bad reaction to any medication? Select one.    No   Not selected:    Yes   If medication is recommended as part of your treatment plan, is that something you're willing to try? Select one.    Yes   Not selected:    No   Knowing your Body Mass Index (BMI) can help your provider choose the best medication for you. To determine your BMI, we need to know your height and weight. Enter your height.    Height   Enter your weight (in pounds).    Weight   Do you need a doctor's note? A doctor's note confirms that you received care today and states when you can return to school or work. It does not contain information about your diagnosis or treatment plan. Your provider will make the final decision on whether to give you a doctor's note. Doctor's notes CANNOT be backdated. Select one.    No   Not selected:    Today only (1 day)    Today and tomorrow (2 days)    3 days   Is there anything you'd like to add about your symptoms? Please limit your comments to the symptoms covered in this interview. If you include comments about other concerns, your provider may recommend that you be seen in person.    I have used Lexapro for years. I decided to stop when I ran out 2 months ago. It has not worked for me. I am  depressed and the symptoms of my anxiety have been very difficult. I would like to restart.   ----------   Medical history   Medical history data does not currently exist for this patient.

## 2024-05-26 NOTE — EXTERNAL PATIENT INSTRUCTIONS
Note   I will restart Lexapro at 5mg. I have also sent a referrl for behavioral health for dose adjustment and counseling. If you wish you can see your [facility] doctor for dose adjustment as well.   Diagnosis   Depression   My name is KAREN Frias, FNP-BC. I'm a healthcare provider at [facility]. I've reviewed your interview, and I see that you have some common symptoms of depression. I'm glad you reached out.   Depression is the most common mental health condition worldwide. In the [place], about 1 in 5 people will experience clinical depression in their lifetime. Fortunately, effective treatments are available. The sooner you start treatment, the better it works.   Treatment for depression can include counseling, coaching, consultations, antidepressant medications, or various digital tools. In creating your treatment plan, I've considered your symptoms, current situation, medical history, and previous treatments, if any.    Please follow up with your provider in a few weeks. They'll check how you're doing and adjust your treatment plan if necessary.   In addition to your prescribed treatment, there are things you can do to help yourself feel better. Depression can lead you to avoid doing tasks, activities, and being with others. Taking action can help break the cycle of avoidance. Even small actions and lifestyle changes can make a big difference. Try some of the suggestions in the Other treatment section below.   Medications   Your pharmacy   [facility] [id number] [address] [place] [address] [phone]     Prescription   Escitalopram (5mg): Take 1 tablet by mouth once a day for mood disorder. You have 2 refills for this prescription. Common brand names include Lexapro.   Orders and referrals   I've included a referral for therapy in your treatment plan. Someone will contact you to schedule an appointment for counseling or therapy.   About your diagnosis   Depression is  different from ordinary sadness. When you're sad or going through normal grief, the feelings may come and go, and then fade over time. Depression causes long-standing symptoms that affect your ability to go about your daily life.   It's a health condition that affects how you think and function, and can even affect your self-esteem. Sometimes depression can also cause physical symptoms such as headaches and stomach pains.   Common symptoms of depression include:    Feeling sad, hopeless, discouraged, or down    Loss of interest or pleasure in previously enjoyable activities    Appetite or weight changes    Sleep disturbances: sleeping too much or too little    Either restlessness or sluggishness    Loss of energy    Excessive guilt    Feelings of worthlessness    Difficulty concentrating    Recurrent thoughts of death or suicide   What to expect   Antidepressant medications take time to work. Many people start to feel better within 2 weeks. But others may not notice improvement until after 4 weeks of treatment. After that, it can take 6 to 12 weeks before the medication has its full effect.   Common side effects of antidepressant medications include:    Dry mouth    Dizziness    Drowsiness    Jitteriness    Nausea, vomiting, or diarrhea    Sexual problems    Weight gain   Many of these side effects will fade after a few days or weeks of use. If any of the side effects are very bothersome or uncomfortable, please let us know. We may be able to change the dose or switch to a different medication. Finding the right medication and the right dose often takes some trial and error.   However, medication should not make your symptoms worse. Contact us immediately if your mood symptoms get worse or if you notice any of the symptoms in the When to seek care section below.   Never stop an antidepressant medicine without first talking to a healthcare provider. Suddenly stopping this type of medication can cause withdrawal  symptoms.   Counseling and talk therapy   Counseling or therapy teaches you new coping skills and more adaptive ways of thinking about problems. These tools can help you make positive changes. The benefits of counseling often last long after treatment sessions have stopped.   When to seek care   If you feel like harming yourself or others, call 911 right away.   The 988 Suicide and Crisis Lifeline is also available. You can call 988   to speak with a counselor at the lifeline, or you can connect with one using their online chat  .   Call us at [phone]   with any sudden or unexpected symptoms.    Worsening depression symptoms    New or worsening anxiety symptoms    Feeling extremely agitated or restless    Panic attacks    Worsening insomnia    New or worsening irritability    Inappropriate aggression, anger, or violence    Dangerous impulses    Extreme increase in activity or talking    Other unusual changes in behavior, mood, thoughts, or feeling    Uncomfortable side effects of new medications    No improvement at all after 2 to 4 weeks on the new medication   Other treatment   The tips below may help you feel better while you start your treatment plan:   Self-care    Depression can make self-care hard, but taking action can help you get better. So start where you are and set small goals. These can be simple: get out of bed, take a shower, get dressed, prepare a meal.    Make a list of activities that usually improve your mood. When you're feeling down, try doing one of those activities, even for a few minutes.    Be kind to yourself. Don't get down on yourself if you don't reach a goal. Be willing to try again.    Try to eat on a regular schedule. Blood sugar levels can affect mood.   Exercise    Physical exercise has an especially positive effect on mood. If you're able to, try walking 30 minutes a day, 3 to 5 times a week. If that sounds like too much, challenge yourself to start walking for just 10  "minutes a day. If walking is not for you, find another activity. Any kind of physical activity helps. The best exercise is the kind you enjoy and will actually do.   Improve your sleep   Getting better sleep is one of the best things you can do to improve your symptoms.    Caffeine, tobacco, and alcohol can cause interrupted sleep. Cutting down or quitting these can improve the quality of your sleep. If you can't quit caffeine completely, try avoiding it later in the day.    Set a regular bedtime, and allow a period of time to \"unwind\" before going to sleep.    Wake up at the same time every day.    Turn off or put away all electronic devices an hour before going to sleep.    Avoid reading, watching TV, or using electronic devices in bed.    As much as possible, keep your bedroom dark, cool, and quiet.    If you're struggling to sleep, don't stay in bed. Get up and go to a quiet spot. Read or do relaxation exercises. Then go back to bed and try again.   Try mindfulness exercises    If your mind races, focus on your body instead. Breathe in slowly through your nose and out through your mouth.    Some people find that meditation helps with mood symptoms. If you want to try meditation but don't know how, mobile apps can get you started.   Use your creativity    When you have depression, you can often spend too much time thinking. Making something with your hands can use your thoughts in a positive way and bring some relief. It also helps you move from inaction to action. Activities like writing in a journal, gardening, woodworking, cooking, or doing a craft can help focus your mind.   Connect with others    If you can't meet in person, send a short text or email to someone just to keep in touch.    If you use social media, notice how it makes you feel. If certain topics or people have a negative effect on your mood, unfollow them. Limit the time you spend on social media. Active participation can be better than passive " scrolling through a feed.    If you're up to it, try volunteering. Or just do something kind for someone. This can lift your mood as well as theirs.   Your provider   Your diagnosis was provided by KAREN Frias FNP-BC, a member of your trusted care team at [facility].   If you have any questions, call us at [phone]  .

## 2024-06-24 ENCOUNTER — TELEPHONE (OUTPATIENT)
Dept: CARDIOLOGY | Facility: CLINIC | Age: 49
End: 2024-06-24
Payer: COMMERCIAL

## 2024-06-24 RX ORDER — FUROSEMIDE 40 MG/1
40 TABLET ORAL 2 TIMES DAILY
Start: 2024-06-24

## 2024-06-24 NOTE — TELEPHONE ENCOUNTER
Can you please contact patient to see if he has been taking this once a day or twice a day.  I will send it in based on patient report

## 2024-06-24 NOTE — TELEPHONE ENCOUNTER
I called pt regarding the Lasix dose but I had to leave a voicemail message to return my call./[email or web address]

## 2024-07-11 ENCOUNTER — OFFICE VISIT (OUTPATIENT)
Dept: CARDIOLOGY | Facility: CLINIC | Age: 49
End: 2024-07-11
Payer: COMMERCIAL

## 2024-07-11 VITALS
BODY MASS INDEX: 39.17 KG/M2 | DIASTOLIC BLOOD PRESSURE: 90 MMHG | HEART RATE: 56 BPM | HEIGHT: 75 IN | WEIGHT: 315 LBS | SYSTOLIC BLOOD PRESSURE: 160 MMHG

## 2024-07-11 DIAGNOSIS — I10 UNCONTROLLED HYPERTENSION: ICD-10-CM

## 2024-07-11 DIAGNOSIS — I10 ESSENTIAL HYPERTENSION: Primary | ICD-10-CM

## 2024-07-11 PROCEDURE — 99214 OFFICE O/P EST MOD 30 MIN: CPT | Performed by: INTERNAL MEDICINE

## 2024-07-11 PROCEDURE — 93000 ELECTROCARDIOGRAM COMPLETE: CPT | Performed by: INTERNAL MEDICINE

## 2024-07-15 ENCOUNTER — OFFICE VISIT (OUTPATIENT)
Dept: ENDOCRINOLOGY | Age: 49
End: 2024-07-15
Payer: COMMERCIAL

## 2024-07-15 VITALS
WEIGHT: 315 LBS | DIASTOLIC BLOOD PRESSURE: 74 MMHG | HEIGHT: 75 IN | BODY MASS INDEX: 39.17 KG/M2 | HEART RATE: 61 BPM | SYSTOLIC BLOOD PRESSURE: 148 MMHG | OXYGEN SATURATION: 96 %

## 2024-07-15 DIAGNOSIS — E11.65 TYPE 2 DIABETES MELLITUS WITH HYPERGLYCEMIA, WITHOUT LONG-TERM CURRENT USE OF INSULIN: Primary | ICD-10-CM

## 2024-07-15 DIAGNOSIS — E66.01 MORBID OBESITY WITH BMI OF 45.0-49.9, ADULT: ICD-10-CM

## 2024-07-15 DIAGNOSIS — E78.5 HYPERLIPIDEMIA, UNSPECIFIED HYPERLIPIDEMIA TYPE: ICD-10-CM

## 2024-07-15 DIAGNOSIS — I10 ESSENTIAL HYPERTENSION: ICD-10-CM

## 2024-07-15 PROCEDURE — 99214 OFFICE O/P EST MOD 30 MIN: CPT | Performed by: INTERNAL MEDICINE

## 2024-07-15 RX ORDER — GLIMEPIRIDE 4 MG/1
4 TABLET ORAL 2 TIMES DAILY WITH MEALS
Qty: 60 TABLET | Refills: 11 | Status: SHIPPED | OUTPATIENT
Start: 2024-07-15

## 2024-07-15 RX ORDER — SEMAGLUTIDE 0.68 MG/ML
0.5 INJECTION, SOLUTION SUBCUTANEOUS WEEKLY
Qty: 3 ML | Refills: 5 | Status: SHIPPED | OUTPATIENT
Start: 2024-07-15

## 2024-07-15 NOTE — PROGRESS NOTES
Chief complaint/Reason for consult:  T2DM    HPI:   - 48 year old male here for management of diabetes mellitus type 2  - Was last seen in 4/2023  - Has had diabetes since 2009  - Complications include CKD with proteinuria on benazapril  - No known complications to date  - Is currently taking Ozempic 1 mg weekly, metformin 1 g bid, Amaryl 4 mg bid  - He was on Ozempic 1 mg weekly and metformin 1 g bid but stopped it because he ran out of medication  - States his diet is poor and he has gained weight recently  - He is also on simvastatin 20 mg daily    The following portions of the patient's history were reviewed and updated as appropriate: allergies, current medications, past family history, past medical history, past social history, past surgical history, and problem list.      Objective     Vitals:    07/15/24 1153   BP: 148/74   Pulse: 61   SpO2: 96%        Physical Exam  Vitals reviewed.   Constitutional:       Appearance: Normal appearance. He is obese.   HENT:      Head: Normocephalic and atraumatic.   Eyes:      General: No scleral icterus.  Pulmonary:      Effort: Pulmonary effort is normal. No respiratory distress.   Neurological:      Mental Status: He is alert.      Gait: Gait normal.   Psychiatric:         Mood and Affect: Mood normal.         Behavior: Behavior normal.         Thought Content: Thought content normal.         Judgment: Judgment normal.         Assessment & Plan   1. Type 2 DM, uncontrolled due to hyperglycemia  - Restart Ozempic 0.5 mg weekly, metformin 1 g bid  - Cont. Amaryl 4 mg bid     2. Obesity (BMI of 51)  - Dietary changes and exercise will help glycemic control     3. Hyperlipidemia  - On atorvastatin 20 mg daily    4. Hypertension  - Will check for endocrine causes of hypertension     - Return to clinic in 7 months

## 2024-07-21 LAB
ALDOST SERPL-MCNC: 5.1 NG/DL (ref 0–30)
ALDOST/RENIN PLAS-RTO: 2.4 {RATIO} (ref 0–30)
METANEPH FREE SERPL-MCNC: <25 PG/ML (ref 0–88)
NORMETANEPHRINE SERPL-MCNC: 29.3 PG/ML (ref 0–218.9)
RENIN PLAS-CCNC: 2.11 NG/ML/HR (ref 0.17–5.38)

## 2024-09-03 RX ORDER — METOPROLOL TARTRATE 50 MG
50 TABLET ORAL 2 TIMES DAILY
Qty: 180 TABLET | Refills: 2 | Status: SHIPPED | OUTPATIENT
Start: 2024-09-03

## 2024-09-03 RX ORDER — AMLODIPINE BESYLATE 10 MG/1
10 TABLET ORAL DAILY
Qty: 90 TABLET | Refills: 3 | Status: SHIPPED | OUTPATIENT
Start: 2024-09-03

## 2024-10-02 ENCOUNTER — OFFICE VISIT (OUTPATIENT)
Dept: ENDOCRINOLOGY | Age: 49
End: 2024-10-02
Payer: COMMERCIAL

## 2024-10-02 VITALS
OXYGEN SATURATION: 95 % | HEIGHT: 75 IN | DIASTOLIC BLOOD PRESSURE: 90 MMHG | HEART RATE: 67 BPM | WEIGHT: 315 LBS | BODY MASS INDEX: 39.17 KG/M2 | SYSTOLIC BLOOD PRESSURE: 144 MMHG

## 2024-10-02 DIAGNOSIS — E11.65 TYPE 2 DIABETES MELLITUS WITH HYPERGLYCEMIA, WITHOUT LONG-TERM CURRENT USE OF INSULIN: Primary | ICD-10-CM

## 2024-10-02 PROCEDURE — 99214 OFFICE O/P EST MOD 30 MIN: CPT | Performed by: INTERNAL MEDICINE

## 2024-10-02 NOTE — PROGRESS NOTES
Chief complaint/Reason for consult: T2DM    HPI:   - 49 year old male here for management of diabetes mellitus type 2  - Was last seen in 7/2024  - Has lost 15 pounds since last visit  - Has had diabetes since 2009  - Complications include CKD with proteinuria on benazapril  - No known complications to date  - Is currently taking Ozempic 0.5 mg weekly, metformin 1 g bid, Amaryl 4 mg bid  - He was on Ozempic 1 mg weekly and metformin 1 g bid but stopped it because he ran out of medication  - States his diet is poor and he has gained weight recently  - He is also on simvastatin 20 mg daily       The following portions of the patient's history were reviewed and updated as appropriate: allergies, current medications, past family history, past medical history, past social history, past surgical history, and problem list.        Objective     Vitals:    10/02/24 1322   BP: 144/90   Pulse: 67   SpO2: 95%        Physical Exam  Vitals reviewed.   Constitutional:       Appearance: Normal appearance.   HENT:      Head: Normocephalic and atraumatic.   Eyes:      General: No scleral icterus.  Pulmonary:      Effort: Pulmonary effort is normal. No respiratory distress.   Neurological:      Mental Status: He is alert.      Gait: Gait normal.   Psychiatric:         Mood and Affect: Mood normal.         Behavior: Behavior normal.         Thought Content: Thought content normal.         Judgment: Judgment normal.         Assessment & Plan   1. Type 2 DM, uncontrolled due to hyperglycemia  - Increase Ozempic to 1 mg weekly  - Cont. Amaryl 4 mg bid and metformin 1 g bid     2. Obesity (BMI of 49)  - Dietary changes and exercise will help glycemic control     3. Hyperlipidemia  - On atorvastatin 20 mg daily        - Return to clinic in 3 months

## 2024-10-03 LAB
ALBUMIN SERPL-MCNC: 4.5 G/DL (ref 3.5–5.2)
ALBUMIN/GLOB SERPL: 1.7 G/DL
ALP SERPL-CCNC: 100 U/L (ref 39–117)
ALT SERPL-CCNC: 8 U/L (ref 1–41)
AST SERPL-CCNC: 16 U/L (ref 1–40)
BILIRUB SERPL-MCNC: 0.3 MG/DL (ref 0–1.2)
BUN SERPL-MCNC: 26 MG/DL (ref 6–20)
BUN/CREAT SERPL: 14.5 (ref 7–25)
CALCIUM SERPL-MCNC: 9.5 MG/DL (ref 8.6–10.5)
CHLORIDE SERPL-SCNC: 108 MMOL/L (ref 98–107)
CHOLEST SERPL-MCNC: 121 MG/DL (ref 0–200)
CO2 SERPL-SCNC: 21.1 MMOL/L (ref 22–29)
CREAT SERPL-MCNC: 1.79 MG/DL (ref 0.76–1.27)
EGFRCR SERPLBLD CKD-EPI 2021: 45.6 ML/MIN/1.73
GLOBULIN SER CALC-MCNC: 2.6 GM/DL
GLUCOSE SERPL-MCNC: 133 MG/DL (ref 65–99)
HBA1C MFR BLD: 10.1 % (ref 4.8–5.6)
HDLC SERPL-MCNC: 27 MG/DL (ref 40–60)
IMP & REVIEW OF LAB RESULTS: NORMAL
LDLC SERPL CALC-MCNC: 58 MG/DL (ref 0–100)
POTASSIUM SERPL-SCNC: 4.5 MMOL/L (ref 3.5–5.2)
PROT SERPL-MCNC: 7.1 G/DL (ref 6–8.5)
SODIUM SERPL-SCNC: 141 MMOL/L (ref 136–145)
TRIGL SERPL-MCNC: 223 MG/DL (ref 0–150)
VLDLC SERPL CALC-MCNC: 36 MG/DL (ref 5–40)

## 2024-12-03 RX ORDER — SPIRONOLACTONE 50 MG/1
50 TABLET, FILM COATED ORAL DAILY
Qty: 90 TABLET | Refills: 2 | Status: SHIPPED | OUTPATIENT
Start: 2024-12-03

## 2025-01-23 ENCOUNTER — TELEPHONE (OUTPATIENT)
Dept: CARDIOLOGY | Facility: CLINIC | Age: 50
End: 2025-01-23
Payer: COMMERCIAL

## 2025-01-23 RX ORDER — FUROSEMIDE 40 MG/1
40 TABLET ORAL 2 TIMES DAILY
Qty: 60 TABLET | Refills: 0 | Status: SHIPPED | OUTPATIENT
Start: 2025-01-23

## 2025-01-23 RX ORDER — METOPROLOL TARTRATE 50 MG
50 TABLET ORAL 2 TIMES DAILY
Qty: 60 TABLET | Refills: 0 | Status: SHIPPED | OUTPATIENT
Start: 2025-01-23

## 2025-01-23 RX ORDER — AMLODIPINE BESYLATE 10 MG/1
10 TABLET ORAL DAILY
Qty: 30 TABLET | Refills: 0 | Status: SHIPPED | OUTPATIENT
Start: 2025-01-23

## 2025-01-23 RX ORDER — SPIRONOLACTONE 100 MG/1
100 TABLET, FILM COATED ORAL DAILY
COMMUNITY
End: 2025-01-23 | Stop reason: SDUPTHER

## 2025-01-23 RX ORDER — BENAZEPRIL HYDROCHLORIDE 40 MG/1
40 TABLET ORAL 2 TIMES DAILY
Qty: 60 TABLET | Refills: 0 | Status: SHIPPED | OUTPATIENT
Start: 2025-01-23

## 2025-01-23 RX ORDER — SPIRONOLACTONE 100 MG/1
100 TABLET, FILM COATED ORAL DAILY
Qty: 60 TABLET | Refills: 0 | Status: SHIPPED | OUTPATIENT
Start: 2025-01-23

## 2025-02-24 RX ORDER — FUROSEMIDE 40 MG/1
40 TABLET ORAL 2 TIMES DAILY
Qty: 60 TABLET | Refills: 0 | Status: SHIPPED | OUTPATIENT
Start: 2025-02-24

## 2025-02-24 RX ORDER — BENAZEPRIL HYDROCHLORIDE 20 MG/1
20 TABLET ORAL 2 TIMES DAILY
Qty: 180 TABLET | Refills: 2 | Status: SHIPPED | OUTPATIENT
Start: 2025-02-24

## 2025-02-25 RX ORDER — SIMVASTATIN 20 MG
20 TABLET ORAL NIGHTLY
Qty: 90 TABLET | Refills: 1 | Status: SHIPPED | OUTPATIENT
Start: 2025-02-25

## 2025-03-20 ENCOUNTER — E-VISIT (OUTPATIENT)
Dept: ADMINISTRATIVE | Facility: OTHER | Age: 50
End: 2025-03-20
Payer: COMMERCIAL

## 2025-03-20 NOTE — E-VISIT ESCALATED
Status: Referred Out  Date: 2025 14:30:20  Acuity Level: Not applicable  Referral message: Based on the information you provided during your interview, eVisit is not appropriate for treating your condition.  Patient: Gerry Thomas  Patient : 1975  Patient Address: [address] [place] [address]  Patient Phone: [phone]  Clinician Response: Unavailable  Diagnosis: Unavailable  Diagnosis ICD: Unavailable     Patient Interview Questions and Responses: None available

## 2025-03-25 RX ORDER — SPIRONOLACTONE 100 MG/1
100 TABLET, FILM COATED ORAL DAILY
Qty: 60 TABLET | Refills: 0 | Status: SHIPPED | OUTPATIENT
Start: 2025-03-25

## 2025-03-27 ENCOUNTER — TELEMEDICINE (OUTPATIENT)
Age: 50
End: 2025-03-27
Payer: COMMERCIAL

## 2025-03-27 DIAGNOSIS — F41.0 PANIC DISORDER: Primary | ICD-10-CM

## 2025-03-27 DIAGNOSIS — F41.1 GENERALIZED ANXIETY DISORDER: ICD-10-CM

## 2025-03-27 RX ORDER — ESCITALOPRAM OXALATE 20 MG/1
TABLET ORAL
Qty: 34 TABLET | Refills: 1 | Status: SHIPPED | OUTPATIENT
Start: 2025-03-27 | End: 2025-05-08 | Stop reason: SDUPTHER

## 2025-03-27 RX ORDER — TRAZODONE HYDROCHLORIDE 50 MG/1
50-100 TABLET ORAL NIGHTLY PRN
Qty: 60 TABLET | Refills: 1 | Status: SHIPPED | OUTPATIENT
Start: 2025-03-27

## 2025-03-27 NOTE — PROGRESS NOTES
"Chief Complaint: Panic attacks    This provider is located at their office at [facility], located at [facility] [place] [address], using a secure [employer]hart Video Visit through [employer]. Patient is being seen remotely via telehealth at their workplace in [place]. The patient's condition being diagnosed/treated is appropriate for telemedicine. The provider identified herself as well as her credentials. The patient gives consent to be seen remotely, but they may refuse to be seen remotely at any time.    Subjective      Gerry M Eroskemi presents to [facility] for initial psychiatric evaluation for chief complaint of panic attacks.     HPI : Garth is a very pleasant 49-year-old male with past medical history including type II diabetes mellitus, hypertension, psoriasis, and MONI with full compliance with CPAP.  He presents to our clinic via telehealth today for his initial psychiatric evaluation with me.  Patient reports that he has always been \"the nervous type\", worrying about everything and everyone a lot more than his peers, overly conscious about what others thought about him and \"overthinking everything at all times\". While he was in college, ~ 25 years ago, t recalls experiencing frequent episodes with sudden anxiety, SOA, tachycardia, chest pain and pressure, pain radiating to his back, diaphoresis, etc. These episodes typically struck \"out of the blue\", without any clear trigger, and he started to fear when the next attack would occur. He felt that the episodes were \"life-destroying\", as they limited his ability to attend class, leave his home, and socialize with others. The only way he felt like he could manage the episodes was by avoidance and retreating to his form. He went to the ED several times due to fear that he might be having medical complications, but work-up was always benign. Eventually, his GP diagnosed him with panic disorder and started him on " "Lexapro, which proved to be remarkably effective at managing pt's panic attacks. Pt saw a psychiatrist for the rest of undergrad and into law school. He eventually felt like he had been doing well for so long on the medication that he wanted to come off of it, but his psychiatrist was concerned about the possiblity of decompensation off of medication and encouraged him to remain on Lexapro. He stayed on it consistently until last year. He experienced several days of \"brain zaps\" and \"blasts of depression\" during the first few weeks of being off of Lexapro. Although these eventually resolved, pt continues to struggle with recurrence of the panic attacks as well as increased irritability and short temper. The panic attacks are again coming \"out of the blue\", and he worries that they are impairing his ability to focus on his work and on his students because he is dreading when he will next have another panic attack. 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 Pt does feel like his mood is down more than usual, but feels like depressed mood is likely secondary to his poorly controlled panic attacks. He denies any recent changes in motivation, interest, and appetite, endorses impaired energy and concentration only following a panic attacks, denies any excessive guilt or hopelessness, and denies suicidal thoughts. He does feel that sleep has always been a struggle for him, even since childhood. He is typically in bed by 10-11pm, wakes up at 5pm feeling rested and has always required less sleep than most to feel rested. Recently he has been having more difficulty falling asleep due to excessive worries, including thoughts about the future and overthinking things he said throughout the " "day. He states that he finds value in life, and knows that he makes a positive difference in the lives of others. At this time, pt denies SI/HI/AVH. He would like to restart Lexapro to help manage his panic attacks.      Psychiatric Review of Systems:    Depression: depressed mood, poor sleep, and diminished ability to concentrate   Veronica: Irritable mood   Anxiety: Excessive anxiety and worry, Difficulty controlling the worry, restlessness, easily fatigued, irritability, muscle tension, sleep disturbance, and 3 or more    Psychosis: None   Panic Attacks: Palpitations, Accelerated heart rate, Sweating, Sensations of shortness of breath or smothering, Feelings of choking, Chest pain or discomfort, Chills, and Fear of \"going crazy\" or losing control   Agoraphobia: Denies all   OCD: Denies   Eating Disorders: Denies all   PTSD: Irritable behavior  and Angry outbursts  Specific Phobias: Denies all  Borderline Personality DO: Denies all  Antisocial Personality DO: Denies all    Past Psychiatric History:   Diagnoses: Panic disorder, LISSETTE  Hospitalizations: Denies  Counseling: None  Suicide attempts: Denies  Self Harm: Denies    Previous Psych Meds: Xanax - prescribed PRN panic attacks and was effective    Substance Use/Abuse:   Pt reports that he has never struggled with drug or alcohol abuse. He describes himself as \"a very light drinker\", having at most 1 beer a couple of times a week. Smoked marijuana in college, but no recent use. Denies any history of formal or informal substance abuse treatment:     Social History:    Childhood: [place], and believes that his childhood was fairly normal. He was raised by his biological mother and father who were  and appeared to have a good relationship with each other when he was growing up but  when he was an adult already. He has a younger brother and a younger sister.    Family structure: Pt has been  to his wife for 21 years, " "and reports that she is very supportive. 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 No longer identifies as Buddhist and states \"I'm not necessarily Pentecostal, but I know right from wrong, and suicide feels wrong\"    Abuse History:    Denies    Legal History:    Denies history of incarceration, violence.      History: None    Past Medical/Developmental History:     Past Medical History:   Diagnosis Date    Chest pressure     stated in 2- Dr. Galloway office note    Diabetes mellitus     Dyspnea     stated in 2- Dr. Galloway office note    Hypertension     Psoriasis     Sleep apnea     uses CPAP    Type 2 diabetes mellitus         Family Psychiatric History:   Mother - anxiety  Brother - panic attacks, anxiety  Sister - panic attacks, anxiety, agoraphobia; hasn't left the house for years.   MGM and MGF - anxiety, EtOH abuse  No known history of suicide attempts.    Current Medications:   Current Outpatient Medications   Medication Sig Dispense Refill    amLODIPine (NORVASC) 10 MG tablet Take 1 tablet by mouth Daily. 30 tablet 0    aspirin 81 MG chewable tablet Chew 1 tablet Daily.      benazepril (LOTENSIN) 20 MG tablet Take 1 tablet by mouth 2 (Two) Times a Day. 180 tablet 2    escitalopram (LEXAPRO) 20 MG tablet Take 1 tablet by mouth Daily.      furosemide (LASIX) 40 MG tablet TAKE 1 TABLET BY MOUTH 2 TIMES A DAY 60 tablet 0    glimepiride (AMARYL) 4 MG tablet Take 1 tablet by mouth 2 (Two) Times a Day With " Meals. 60 tablet 11    metFORMIN (GLUCOPHAGE) 1000 MG tablet Take 1 tablet by mouth 2 (Two) Times a Day With Meals. 60 tablet 11    metoprolol tartrate (LOPRESSOR) 50 MG tablet Take 1 tablet by mouth 2 (Two) Times a Day. 60 tablet 0    multivitamin with minerals tablet tablet Daily.      Semaglutide, 1 MG/DOSE, (OZEMPIC) 4 MG/3ML solution pen-injector Inject 1 mg under the skin into the appropriate area as directed 1 (One) Time Per Week. 3 mL 5    simvastatin (ZOCOR) 20 MG tablet Take 1 tablet by mouth Every Night. 90 tablet 1    spironolactone (ALDACTONE) 100 MG tablet TAKE 1 TABLET BY MOUTH DAILY 60 tablet 0     No current facility-administered medications for this visit.       Review of Systems   Constitutional:  Positive for fatigue. Negative for appetite change, unexpected weight gain and unexpected weight loss.   HENT: Negative.     Cardiovascular: Negative.    Gastrointestinal: Negative.    Genitourinary: Negative.    Musculoskeletal: Negative.    Skin: Negative.    Neurological: Negative.    Psychiatric/Behavioral:  Positive for decreased concentration, dysphoric mood, sleep disturbance and stress. Negative for hallucinations, self-injury and suicidal ideas. The patient is nervous/anxious.         Mental Status Exam:   MENTAL STATUS EXAM   General Appearance:  Cleanly groomed and dressed  Eye Contact:  Good eye contact  Attitude:  Cooperative  Motor Activity:  Normal gait, posture  Speech:  Normal rate, tone, volume  Language:  Spontaneous  Mood and affect:  Anxious and mood congruent  Hopelessness:  Denies  Loneliness: Denies  Thought Process:  Logical, goal-directed and linear  Associations/ Thought Content:  No delusions  Hallucinations:  None  Suicidal Ideations:  Not present  Homicidal Ideation:  Not present  Sensorium:  Alert and clear  Orientation:  Person, place, time and situation  Attention Span/ Concentration:  Good  Fund of Knowledge:  Appropriate for age and educational level  Intellectual  Functioning:  Above average  Insight:  Good  Judgement:  Good  Reliability:  Good     Objective   Vital Signs:   There were no vitals taken for this visit.      Result Review :     The following data was reviewed by: Ariadne Noland MD on 03/27/2025:  CMP          10/2/2024    13:38   CMP   Glucose 133    BUN 26    Creatinine 1.79    EGFR 45.6    Sodium 141    Potassium 4.5    Chloride 108    Calcium 9.5    Total Protein 7.1    Albumin 4.5    Globulin 2.6    Total Bilirubin 0.3    Alkaline Phosphatase 100    AST (SGOT) 16    ALT (SGPT) 8    Albumin/Globulin Ratio 1.7    BUN/Creatinine Ratio 14.5        Lipid Panel          6/6/2024    09:32 10/2/2024    13:38   Lipid Panel   Total Cholesterol 211.0     121    Triglycerides  223    HDL Cholesterol 34     27    VLDL Cholesterol  36    LDL Cholesterol  103.2     58       Details          This result is from an external source.             Most Recent A1C          10/2/2024    13:38   HGBA1C Most Recent   Hemoglobin A1C 10.10        PHQ-9 Score:  Not completed due to telehealth visit    LISSETTE-7: Not completed due to telehealth visit         Tobacco Cessation:  Denies use. No cessation goals required.    Impression/Treatment Plan:    Pt presents via telehealth today for his initial intake evaluation. He has struggled with anxiety for much of his life, along with intermittent periods of depression. However, most problematic for pt at this time is the recurrence of untriggered and certainly unwanted panic attacks since discontinuing Lexapro several months ago. As Lexapro was previously effective for pt, it is likely that he will again respond well once Lexapro is restarted. Will start Lexapro at 10 mg QD for management of panic disorder with LSISETTE, and increase to target dose of 20 mg QD after 5-7 days to minimize risk of adverse side effects while restarting Lexapro. Will also start Trazodone  mg QHS as needed for sleep, as impaired sleep may be contributing somewhat  to pt's anxiety and overall poor mood.     Diagnosis Plan   1. Panic disorder        2. Generalized anxiety disorder  traZODone (DESYREL) 50 MG tablet          MEDS ORDERED DURING VISIT:  New Medications Ordered This Visit   Medications    traZODone (DESYREL) 50 MG tablet     Sig: Take 1-2 tablets by mouth At Night As Needed for Sleep.     Dispense:  60 tablet     Refill:  1     Lexapro 20 mg tabs    Umang reviewed and is appropriate.    Follow Up   Return in 6 weeks (on 5/8/2025) for Video visit, Next scheduled follow up.    Patient was given instructions and counseling regarding his condition or for health maintenance advice. Please see specific information pulled into the AVS if appropriate.     PATIENT EDUCATION:  - Discussed medication options and treatment plan of prescribed medication as well as the risks, benefits, and side effects   - Encouraged pt to consider psychotherapy if symptoms do not adequately respond to medication.   - Pt was educated on the importance of compliance with treatment and follow-up appointments.   - Patient is agreeable to call the office with any worsening of symptoms or onset of side effects.   - Patient is agreeable to call 911 or go to the nearest ER should he/she begin having SI/HI.        This document has been electronically signed by Ariadne Noland MD.

## 2025-04-03 RX ORDER — FUROSEMIDE 40 MG/1
40 TABLET ORAL 2 TIMES DAILY
Qty: 60 TABLET | Refills: 0 | Status: SHIPPED | OUTPATIENT
Start: 2025-04-03

## 2025-05-02 ENCOUNTER — TELEPHONE (OUTPATIENT)
Dept: GASTROENTEROLOGY | Facility: CLINIC | Age: 50
End: 2025-05-02
Payer: COMMERCIAL

## 2025-05-02 NOTE — TELEPHONE ENCOUNTER
Screening colonoscopy    No personal hx polyps  Family hx polyps  Family hx cx - grandfather,     Asa or blood thinners:  Aleve  Ibuprofen    List medications:  Furosemide  Lexapro  Spironolactone  Simvastatin  Benaprazil  Norvasc  Lopressor  Glimepiride  Metformin    OA form scanned in media

## 2025-05-03 DIAGNOSIS — Z12.11 ENCOUNTER FOR SCREENING FOR MALIGNANT NEOPLASM OF COLON: Primary | ICD-10-CM

## 2025-05-03 RX ORDER — SODIUM CHLORIDE, SODIUM LACTATE, POTASSIUM CHLORIDE, CALCIUM CHLORIDE 600; 310; 30; 20 MG/100ML; MG/100ML; MG/100ML; MG/100ML
30 INJECTION, SOLUTION INTRAVENOUS CONTINUOUS
OUTPATIENT
Start: 2025-05-03 | End: 2025-05-03

## 2025-05-05 ENCOUNTER — TELEPHONE (OUTPATIENT)
Dept: GASTROENTEROLOGY | Facility: CLINIC | Age: 50
End: 2025-05-05
Payer: COMMERCIAL

## 2025-05-06 NOTE — TELEPHONE ENCOUNTER
Email sent to EP  for COLONOSCOPY on 7/1/25  arrive at 12:30  . Sent prep instructions to pt my chart....miralax

## 2025-05-08 ENCOUNTER — TELEMEDICINE (OUTPATIENT)
Age: 50
End: 2025-05-08
Payer: COMMERCIAL

## 2025-05-08 DIAGNOSIS — F41.0 PANIC DISORDER: ICD-10-CM

## 2025-05-08 DIAGNOSIS — F41.1 GENERALIZED ANXIETY DISORDER: ICD-10-CM

## 2025-05-08 PROCEDURE — 99214 OFFICE O/P EST MOD 30 MIN: CPT | Performed by: STUDENT IN AN ORGANIZED HEALTH CARE EDUCATION/TRAINING PROGRAM

## 2025-05-08 RX ORDER — ESCITALOPRAM OXALATE 20 MG/1
20 TABLET ORAL DAILY
Qty: 90 TABLET | Refills: 1 | Status: SHIPPED | OUTPATIENT
Start: 2025-05-08

## 2025-05-08 NOTE — PROGRESS NOTES
"    Psychiatry Follow-up Note     Name: Gerry Thomas    : 1975     MRN: [id number]     Chief Complaint  Med management follow-up for panic attacks, anxiety    Subjective     This provider is located at their office at [facility], located at [address] [place] [address], using a secure MyChart Video Visit through [employer]. Patient is being seen remotely via telehealth at their home residence in [place]. The patient's condition being diagnosed/treated is appropriate for telemedicine. The provider identified herself as well as her credentials. The patient gives consent to be seen remotely, but they may refuse to be seen remotely at any time.    Patient or patient representative verbalized consent for the use of Ambient Listening during the visit with  Ariadne Noland MD for chart documentation. 2025  15:41 EDT    History of Present Illness: Gerry Thomas presents to [facility] via telehealth for via virtual visit for follow-up of panic attacks and generalized anxiety.  History of Present Illness  He reports an improvement in his depression, anxiety, and panic attacks since we last met, attributing this to the reintroduction of Lexapro into his treatment regimen. He recognizes that the medication's full effect may take some time to manifest but notes a positive trend in his mood. He is not experiencing severe anxiety episodes as before, and although he does still have \"down times\" occasionally, he finds his current state manageable. He reports no adverse effects from Lexapro.  Sleep has been improving. Despite previously endorsing going to bed around 10-11 AM and waking at 5 AM, pt now admits that he often goes to bed around midnight or 1 AM but still feels well-rested when he wakes up at 5 AM on school days. On weekends, he allows himself more sleep, retiring at 2 AM and rising at 10 AM. He has been using trazodone 100 mg sparingly -- only three " times in the past month -- but finds it helpful for sleep initiation when taking it. He does feel a little drowsy in the AM after taking a dose, but this wears off quickly. Appetite has remained stable. Regarding energy, he feels like average energy level is lower than he would like for it to be, but he acknowledges that being overweight and out of shape are the most likely contributors to this issue at this time. He was previously prescribed Ozempic, which initially aided in weight loss and improved his A1c levels, but he discontinued its use due to intolerable side effects. He plans to consult with his endocrinologist regarding this issue. He has not had any suicidal ideation, homicidal thoughts, hallucinations, or concerns for vandana in the past month. He has not had any recent illnesses or changes in his medication regimen since we last met. He expresses satisfaction with his current treatment plan and wishes to continue it without modifications.     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    Pertinent Negatives: No recent illnesses, no adverse effects from Lexapro, no suicidal ideation, homicidal thoughts, hallucinations, or manic episodes since last visit    Medications:     Current Outpatient Medications:     amLODIPine (NORVASC) 10 MG tablet, Take 1 tablet by mouth Daily., Disp: 30 tablet, Rfl: 0    aspirin 81 MG chewable tablet, Chew 1 tablet Daily., Disp: , Rfl:     benazepril (LOTENSIN) 20 MG tablet, Take 1 tablet by mouth 2 (Two) Times a Day., Disp: 180 tablet, Rfl: 2    escitalopram (LEXAPRO) 20 MG tablet, Take 0.5 tab by mouth daily for 5-7 days, then increase to 1 tab by mouth daily if tolerating well., Disp: 34 tablet, Rfl: 1    furosemide (LASIX) 40 MG tablet, TAKE 1 TABLET BY MOUTH 2 TIMES A DAY, Disp: 60 tablet, Rfl: 0    glimepiride (AMARYL) 4 MG  tablet, Take 1 tablet by mouth 2 (Two) Times a Day With Meals., Disp: 60 tablet, Rfl: 11    metFORMIN (GLUCOPHAGE) 1000 MG tablet, Take 1 tablet by mouth 2 (Two) Times a Day With Meals., Disp: 60 tablet, Rfl: 11    metoprolol tartrate (LOPRESSOR) 50 MG tablet, Take 1 tablet by mouth 2 (Two) Times a Day., Disp: 60 tablet, Rfl: 0    multivitamin with minerals tablet tablet, Daily., Disp: , Rfl:     Semaglutide, 1 MG/DOSE, (OZEMPIC) 4 MG/3ML solution pen-injector, Inject 1 mg under the skin into the appropriate area as directed 1 (One) Time Per Week., Disp: 3 mL, Rfl: 5    simvastatin (ZOCOR) 20 MG tablet, Take 1 tablet by mouth Every Night., Disp: 90 tablet, Rfl: 1    spironolactone (ALDACTONE) 100 MG tablet, TAKE 1 TABLET BY MOUTH DAILY, Disp: 60 tablet, Rfl: 0    traZODone (DESYREL) 50 MG tablet, Take 1-2 tablets by mouth At Night As Needed for Sleep., Disp: 60 tablet, Rfl: 1    Allergies:   No Known Allergies    Family History:   Family History   Problem Relation Age of Onset    Stroke Father        Social History:   Social History     Socioeconomic History    Marital status:    Tobacco Use    Smoking status: Never    Smokeless tobacco: Never   Vaping Use    Vaping status: Never Used   Substance and Sexual Activity    Alcohol use: Yes     Comment: socially;  caffeine use- diet mt dew    Drug use: Never    Sexual activity: Defer       Objective     Vital Signs:   Unable to be obtained due to telehealth visit      Physical Exam  General Physical Appearance:  The patient appears well-groomed and appropriately dressed for the telehealth visit. No signs of distress or discomfort are noted.  Skin:  No visible signs of self-harm or physical abuse.  General Behavior:  The patient is cooperative and engaged throughout the session.  Speech Characteristics:  Speech is clear, coherent, and appropriately paced.  Mood and Affect:  The patient reports an improvement in mood since resuming Lexapro and currently reports mood  "to be \"good\". Affect is congruent with the reported mood, full and euthymic.  Thought Processes:  Thought processes are logical and goal-directed.  Thought Content:  No evidence of delusions or obsessive thoughts.  Harmful Thoughts:  The patient denies any suicidal ideation or thoughts of harming others.  Perceptual Disturbances:  The patient denies experiencing any hallucinations.  Sensorium and Cognitive Functions:  The patient is alert and oriented to person, place, and time. Cognitive functions appear intact.  Insight and Judgment:  The patient demonstrates good insight into their condition and exhibits sound judgment.  Motivation:  The patient is motivated to continue with the current treatment plan and expresses a desire to maintain improvements in mood and overall well-being.     Current Medications:   Current Outpatient Medications   Medication Sig Dispense Refill    amLODIPine (NORVASC) 10 MG tablet Take 1 tablet by mouth Daily. 30 tablet 0    aspirin 81 MG chewable tablet Chew 1 tablet Daily.      benazepril (LOTENSIN) 20 MG tablet Take 1 tablet by mouth 2 (Two) Times a Day. 180 tablet 2    escitalopram (LEXAPRO) 20 MG tablet Take 0.5 tab by mouth daily for 5-7 days, then increase to 1 tab by mouth daily if tolerating well. 34 tablet 1    furosemide (LASIX) 40 MG tablet TAKE 1 TABLET BY MOUTH 2 TIMES A DAY 60 tablet 0    glimepiride (AMARYL) 4 MG tablet Take 1 tablet by mouth 2 (Two) Times a Day With Meals. 60 tablet 11    metFORMIN (GLUCOPHAGE) 1000 MG tablet Take 1 tablet by mouth 2 (Two) Times a Day With Meals. 60 tablet 11    metoprolol tartrate (LOPRESSOR) 50 MG tablet Take 1 tablet by mouth 2 (Two) Times a Day. 60 tablet 0    multivitamin with minerals tablet tablet Daily.      Semaglutide, 1 MG/DOSE, (OZEMPIC) 4 MG/3ML solution pen-injector Inject 1 mg under the skin into the appropriate area as directed 1 (One) Time Per Week. 3 mL 5    simvastatin (ZOCOR) 20 MG tablet Take 1 tablet by mouth Every " Night. 90 tablet 1    spironolactone (ALDACTONE) 100 MG tablet TAKE 1 TABLET BY MOUTH DAILY 60 tablet 0    traZODone (DESYREL) 50 MG tablet Take 1-2 tablets by mouth At Night As Needed for Sleep. 60 tablet 1     No current facility-administered medications for this visit.     Assessment and Plan           Diagnosis Plan   1. Panic disorder  escitalopram (LEXAPRO) 20 MG tablet      2. Generalized anxiety disorder  escitalopram (LEXAPRO) 20 MG tablet        Assessment & Plan    Clinical Impression:  The patient's mental health has improved significantly, with fewer and less severe panic attacks and overall improved mood. He reports feeling more stable and capable of managing down moments. Sleep remains a challenge, but he is able to function adequately despite limited sleep during weekdays. His energy levels are not optimal, likely influenced by his weight and overall physical condition. No adverse effects from Lexapro were noted, and there are no current SI/HI/AVH. Pt is pleased with current treatment plan and is showing good therapeutic response, so no changes recommended today.     Plan:  - Continue Lexapro 20 mg daily, with a prescription refill for a 3-month supply.  - Maintain trazodone 100 mg for use as needed during periods of poor sleep.  - Consult with the endocrinologist regarding the continuation or discontinuation of Ozempic.  - Engage in regular physical activity to improve energy levels and overall wellness.  - Monitor mood and sleep patterns, and report any significant changes.  - We discussed risks, benefits, and side effects of the above medications and the patient was agreeable with the plan. Patient was educated on the importance of compliance with treatment and follow-up appointments.  Patient is agreeable to call the office with any worsening of symptoms or onset of side effects. Patient is agreeable to call 911 or go to the nearest ER should he/she begin having SI/HI.     Therapeutic  Intervention:  - Cognitive-behavioral strategies were employed to reframe negative thoughts and reinforce positive progress.  - Psychoeducation was provided regarding sleep hygiene and the use of trazodone.  - Supportive therapy focused on validating the patient's experiences and encouraging self-care practices.    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    Notes & Risk Factors:  - No current risk factors for harm to self or others.  - Protective factors include stable employment, positive work environment, and ongoing medical support.      I spent 36 minutes caring for Gerry on this date of service. This time includes time spent by me in the following activities:preparing for the visit, performing a medically appropriate examination and/or evaluation , counseling and educating the patient/family/caregiver, ordering medications, tests, or procedures, and documenting information in the medical record    Follow Up   Return in about 3 months (around 8/8/2025) for Recheck, Video visit.    Patient was given instructions and counseling regarding his condition or for health maintenance advice. Please see specific information pulled into the AVS if appropriate.       This document has been electronically signed by Ariadne Noland MD  May 8, 2025 15:39 EDT

## 2025-05-23 RX ORDER — SPIRONOLACTONE 100 MG/1
100 TABLET, FILM COATED ORAL DAILY
Qty: 90 TABLET | Refills: 3 | Status: SHIPPED | OUTPATIENT
Start: 2025-05-23

## 2025-06-04 ENCOUNTER — TELEPHONE (OUTPATIENT)
Age: 50
End: 2025-06-04
Payer: COMMERCIAL

## 2025-06-04 DIAGNOSIS — F41.1 GENERALIZED ANXIETY DISORDER: ICD-10-CM

## 2025-06-04 RX ORDER — TRAZODONE HYDROCHLORIDE 50 MG/1
50-100 TABLET ORAL NIGHTLY PRN
Qty: 60 TABLET | Refills: 1 | Status: SHIPPED | OUTPATIENT
Start: 2025-06-04

## 2025-06-04 NOTE — TELEPHONE ENCOUNTER
Rx Refill Note  Requested Prescriptions     Pending Prescriptions Disp Refills    traZODone (DESYREL) 50 MG tablet 60 tablet 1     Sig: Take 1-2 tablets by mouth At Night As Needed for Sleep.      Last office visit with prescribing clinician: Visit date not found   Last telemedicine visit with prescribing clinician: 5/8/2025   Next office visit with prescribing clinician: 7/21/2025     Charmaine Chamberlain CMA  06/04/25, 13:40 EDT

## 2025-06-24 ENCOUNTER — TELEPHONE (OUTPATIENT)
Dept: CARDIOLOGY | Age: 50
End: 2025-06-24
Payer: COMMERCIAL

## 2025-06-24 DIAGNOSIS — N18.9 CHRONIC KIDNEY DISEASE, UNSPECIFIED CKD STAGE: ICD-10-CM

## 2025-06-24 DIAGNOSIS — I10 ESSENTIAL HYPERTENSION: Primary | ICD-10-CM

## 2025-06-24 RX ORDER — FUROSEMIDE 40 MG/1
40 TABLET ORAL 2 TIMES DAILY
Qty: 180 TABLET | Refills: 0 | Status: SHIPPED | OUTPATIENT
Start: 2025-06-24

## 2025-06-24 NOTE — TELEPHONE ENCOUNTER
Patient was a no show in April. Please call to discuss rescheduling and that he needs repeat blood work to check kidney function for more refills to be allowed by me. I will give one 90 day supply then I will refuse this refill moving forward     Tyesha- please call to relay my message and need to schedule appt. I will place a lab order and he can have the lab anytime without fasting

## 2025-07-10 ENCOUNTER — TELEPHONE (OUTPATIENT)
Age: 50
End: 2025-07-10
Payer: COMMERCIAL

## 2025-07-10 NOTE — TELEPHONE ENCOUNTER
DARREL for a return call to give patient the Virtual clinic's phone number to schedule an appointment.

## 2025-07-14 ENCOUNTER — TELEPHONE (OUTPATIENT)
Dept: GASTROENTEROLOGY | Facility: CLINIC | Age: 50
End: 2025-07-14
Payer: COMMERCIAL

## 2025-07-14 PROBLEM — Z12.11 SCREEN FOR COLON CANCER: Status: ACTIVE | Noted: 2023-09-13

## 2025-07-14 NOTE — TELEPHONE ENCOUNTER
Msg sent Kerrie.      Pt states his scope was rescheduled for 7/18 but he has never received any information regarding time, place or location. Advised will send a msg to Kerrie.

## 2025-07-14 NOTE — TELEPHONE ENCOUNTER
"  Hub staff attempted to follow warm transfer process and was unsuccessful     Caller: Gerry Thomas \"NATHANIEL\"    Relationship to patient: Self    Best call back number: [phone]    Patient is needing: TO HAVE QUESTIONS ANSWERED ABOUT COLONOSCOPY          "

## 2025-07-15 RX ORDER — METOPROLOL TARTRATE 50 MG
50 TABLET ORAL 2 TIMES DAILY
Qty: 180 TABLET | Refills: 0 | Status: SHIPPED | OUTPATIENT
Start: 2025-07-15

## 2025-07-18 ENCOUNTER — ANESTHESIA (OUTPATIENT)
Dept: GASTROENTEROLOGY | Facility: HOSPITAL | Age: 50
End: 2025-07-18
Payer: COMMERCIAL

## 2025-07-18 ENCOUNTER — HOSPITAL ENCOUNTER (OUTPATIENT)
Facility: HOSPITAL | Age: 50
Setting detail: HOSPITAL OUTPATIENT SURGERY
Discharge: HOME | End: 2025-07-18
Attending: INTERNAL MEDICINE | Admitting: INTERNAL MEDICINE
Payer: COMMERCIAL

## 2025-07-18 ENCOUNTER — ANESTHESIA EVENT (OUTPATIENT)
Dept: GASTROENTEROLOGY | Facility: HOSPITAL | Age: 50
End: 2025-07-18
Payer: COMMERCIAL

## 2025-07-18 VITALS
RESPIRATION RATE: 16 BRPM | BODY MASS INDEX: 39.17 KG/M2 | HEIGHT: 75 IN | OXYGEN SATURATION: 97 % | DIASTOLIC BLOOD PRESSURE: 64 MMHG | WEIGHT: 315 LBS | HEART RATE: 56 BPM | SYSTOLIC BLOOD PRESSURE: 118 MMHG

## 2025-07-18 DIAGNOSIS — Z12.11 ENCOUNTER FOR SCREENING FOR MALIGNANT NEOPLASM OF COLON: ICD-10-CM

## 2025-07-18 LAB — GLUCOSE BLDC GLUCOMTR-MCNC: 145 MG/DL (ref 70–130)

## 2025-07-18 PROCEDURE — 25010000002 PROPOFOL 200 MG/20ML EMULSION: Performed by: NURSE ANESTHETIST, CERTIFIED REGISTERED

## 2025-07-18 PROCEDURE — 25810000003 LACTATED RINGERS PER 1000 ML: Performed by: INTERNAL MEDICINE

## 2025-07-18 PROCEDURE — 82948 REAGENT STRIP/BLOOD GLUCOSE: CPT

## 2025-07-18 PROCEDURE — 25010000002 LIDOCAINE 2% SOLUTION: Performed by: NURSE ANESTHETIST, CERTIFIED REGISTERED

## 2025-07-18 PROCEDURE — 25010000002 GLYCOPYRROLATE 0.2 MG/ML SOLUTION: Performed by: NURSE ANESTHETIST, CERTIFIED REGISTERED

## 2025-07-18 PROCEDURE — 88305 TISSUE EXAM BY PATHOLOGIST: CPT | Performed by: INTERNAL MEDICINE

## 2025-07-18 PROCEDURE — 45385 COLONOSCOPY W/LESION REMOVAL: CPT | Performed by: INTERNAL MEDICINE

## 2025-07-18 PROCEDURE — 45380 COLONOSCOPY AND BIOPSY: CPT | Performed by: INTERNAL MEDICINE

## 2025-07-18 RX ORDER — LIDOCAINE HYDROCHLORIDE 20 MG/ML
INJECTION, SOLUTION INFILTRATION; PERINEURAL AS NEEDED
Status: DISCONTINUED | OUTPATIENT
Start: 2025-07-18 | End: 2025-07-18 | Stop reason: SURG

## 2025-07-18 RX ORDER — SODIUM CHLORIDE, SODIUM LACTATE, POTASSIUM CHLORIDE, CALCIUM CHLORIDE 600; 310; 30; 20 MG/100ML; MG/100ML; MG/100ML; MG/100ML
30 INJECTION, SOLUTION INTRAVENOUS CONTINUOUS PRN
Status: DISCONTINUED | OUTPATIENT
Start: 2025-07-18 | End: 2025-07-18 | Stop reason: HOSPADM

## 2025-07-18 RX ORDER — GLYCOPYRROLATE 0.2 MG/ML
INJECTION INTRAMUSCULAR; INTRAVENOUS AS NEEDED
Status: DISCONTINUED | OUTPATIENT
Start: 2025-07-18 | End: 2025-07-18 | Stop reason: SURG

## 2025-07-18 RX ORDER — PROPOFOL 10 MG/ML
INJECTION, EMULSION INTRAVENOUS CONTINUOUS PRN
Status: DISCONTINUED | OUTPATIENT
Start: 2025-07-18 | End: 2025-07-18 | Stop reason: SURG

## 2025-07-18 RX ADMIN — GLYCOPYRROLATE 0.1 MG: 0.2 INJECTION INTRAMUSCULAR; INTRAVENOUS at 08:44

## 2025-07-18 RX ADMIN — LIDOCAINE HYDROCHLORIDE 60 MG: 20 INJECTION, SOLUTION INFILTRATION; PERINEURAL at 08:44

## 2025-07-18 RX ADMIN — PROPOFOL 200 MCG/KG/MIN: 10 INJECTION, EMULSION INTRAVENOUS at 08:45

## 2025-07-18 RX ADMIN — PROPOFOL 100 MG: 10 INJECTION, EMULSION INTRAVENOUS at 08:45

## 2025-07-18 RX ADMIN — SODIUM CHLORIDE, POTASSIUM CHLORIDE, SODIUM LACTATE AND CALCIUM CHLORIDE 30 ML/HR: 600; 310; 30; 20 INJECTION, SOLUTION INTRAVENOUS at 08:18

## 2025-07-18 NOTE — ANESTHESIA PREPROCEDURE EVALUATION
Anesthesia Evaluation     Patient summary reviewed and Nursing notes reviewed   history of anesthetic complications:  PONV  NPO Solid Status: > 8 hours  NPO Liquid Status: > 4 hours           Airway   Mallampati: II  TM distance: <3 FB  Neck ROM: full  No difficulty expected  Dental - normal exam     Pulmonary     breath sounds clear to auscultation  (+) ,shortness of breath, sleep apnea on CPAP  Cardiovascular     Rhythm: regular    (+) hypertension, CHF       Neuro/Psych  GI/Hepatic/Renal/Endo    (+) obesity, morbid obesity, diabetes mellitus    ROS Comment: BMI 50 400 lbs    Musculoskeletal     Abdominal   (+) obese   Substance History      OB/GYN          Other                    Anesthesia Plan    ASA 4     MAC     intravenous induction     Anesthetic plan, risks, benefits, and alternatives have been provided, discussed and informed consent has been obtained with: patient.    CODE STATUS:

## 2025-07-18 NOTE — ANESTHESIA POSTPROCEDURE EVALUATION
"Patient: Gerry Thomas    Procedure Summary       Date: 07/18/25 Room / Location: [facility] ENDOSCOPY 10 /  [facility]    Anesthesia Start: 0841 Anesthesia Stop: 0907    Procedure: COLONOSCOPY to the cecum with cold biopsy and cold snare polypectomies Diagnosis:       Encounter for screening for malignant neoplasm of colon      (Encounter for screening for malignant neoplasm of colon [Z12.11])    Surgeons: Paolo Ford MD Provider: Yomi So MD    Anesthesia Type: MAC ASA Status: 4            Anesthesia Type: MAC    Vitals  Vitals Value Taken Time   /64 07/18/25 09:27   Temp     Pulse 56 07/18/25 09:27   Resp 16 07/18/25 09:27   SpO2 97 % 07/18/25 09:27           Post Anesthesia Care and Evaluation    Patient location during evaluation: bedside  Patient participation: complete - patient participated  Level of consciousness: sleepy but conscious  Pain score: 0  Pain management: adequate    Airway patency: patent  Anesthetic complications: No anesthetic complications    Cardiovascular status: acceptable  Respiratory status: acceptable  Hydration status: acceptable    Comments: /64 (BP Location: Left arm, Patient Position: Sitting)   Pulse 56   Resp 16   Ht 190.5 cm (75\")   Wt (!) 181 kg (398 lb 1.6 oz)   SpO2 97%   BMI 49.76 kg/m²       "

## 2025-07-18 NOTE — H&P
[facility]  Pre Procedure History & Physical    Chief Complaint:   Time for my colonoscopy    Subjective     HPI:   49 y.o. male presenting to endoscopy unit today for screening colonoscopy.    Past Medical History:   Past Medical History:   Diagnosis Date    Chest pressure     stated in 2- Dr. Galloway office note    Diabetes mellitus     Dyspnea     stated in 2- Dr. Galloway office note    Family history of colon cancer     Hypertension     PONV (postoperative nausea and vomiting)     Psoriasis     Sleep apnea     uses CPAP    Type 2 diabetes mellitus        Family History:  Family History   Problem Relation Age of Onset    Stroke Father     Malruth Hyperthermia Neg Hx        Social History:   reports that he has never smoked. He has never used smokeless tobacco. He reports current alcohol use. He reports that he does not use drugs.    Medications:   Medications Prior to Admission   Medication Sig Dispense Refill Last Dose/Taking    amLODIPine (NORVASC) 10 MG tablet Take 1 tablet by mouth Daily. 30 tablet 0 7/17/2025    benazepril (LOTENSIN) 20 MG tablet Take 1 tablet by mouth 2 (Two) Times a Day. 180 tablet 2 7/17/2025    escitalopram (LEXAPRO) 20 MG tablet Take 1 tablet by mouth Daily. 90 tablet 1 7/17/2025    furosemide (LASIX) 40 MG tablet TAKE 1 TABLET BY MOUTH 2 TIMES A  tablet 0 7/17/2025    glimepiride (AMARYL) 4 MG tablet Take 1 tablet by mouth 2 (Two) Times a Day With Meals. 60 tablet 11 7/17/2025    metFORMIN (GLUCOPHAGE) 1000 MG tablet Take 1 tablet by mouth 2 (Two) Times a Day With Meals. 60 tablet 11 Taking    metoprolol tartrate (LOPRESSOR) 50 MG tablet TAKE 1 TABLET BY MOUTH 2 TIMES A  tablet 0 7/17/2025    simvastatin (ZOCOR) 20 MG tablet Take 1 tablet by mouth Every Night. 90 tablet 1 7/17/2025    spironolactone (ALDACTONE) 100 MG tablet TAKE 1 TABLET BY MOUTH DAILY 90 tablet 3 7/17/2025    aspirin 81 MG chewable tablet Chew 1 tablet Daily. (Patient  "not taking: Reported on 7/17/2025)   Not Taking    multivitamin with minerals tablet tablet Daily. (Patient not taking: Reported on 7/17/2025)   Not Taking    Semaglutide, 1 MG/DOSE, (OZEMPIC) 4 MG/3ML solution pen-injector Inject 1 mg under the skin into the appropriate area as directed 1 (One) Time Per Week. (Patient not taking: Reported on 7/17/2025) 3 mL 5 Not Taking    traZODone (DESYREL) 50 MG tablet Take 1-2 tablets by mouth At Night As Needed for Sleep. 60 tablet 1 More than a month       Allergies:  Patient has no known allergies.      Objective     Blood pressure 146/62, pulse 63, resp. rate 20, height 190.5 cm (75\"), weight (!) 181 kg (398 lb 1.6 oz), SpO2 96%.  Physical Exam:   General: patient awake, alert and cooperative    Assessment & Plan     Diagnosis:  Encounter for screening for colon cancer    Anticipated Surgical Procedure:  Colonoscopy    The risks, benefits, and alternatives of this procedure have been discussed with the patient or the responsible party- the patient understands and agrees to proceed.                                                                  "

## 2025-07-21 LAB
CYTO UR: NORMAL
LAB AP CASE REPORT: NORMAL
PATH REPORT.FINAL DX SPEC: NORMAL
PATH REPORT.GROSS SPEC: NORMAL

## 2025-08-07 ENCOUNTER — TELEMEDICINE (OUTPATIENT)
Dept: PSYCHIATRY | Facility: CLINIC | Age: 50
End: 2025-08-07
Payer: COMMERCIAL

## 2025-08-07 DIAGNOSIS — F41.1 GENERALIZED ANXIETY DISORDER: Primary | ICD-10-CM

## 2025-08-07 DIAGNOSIS — G47.9 SLEEP DIFFICULTIES: ICD-10-CM

## 2025-08-07 DIAGNOSIS — F33.0 MILD EPISODE OF RECURRENT MAJOR DEPRESSIVE DISORDER: ICD-10-CM

## 2025-08-07 DIAGNOSIS — Z86.59 HISTORY OF PANIC ATTACKS: ICD-10-CM

## 2025-08-07 RX ORDER — TRAZODONE HYDROCHLORIDE 100 MG/1
100 TABLET ORAL NIGHTLY PRN
Qty: 30 TABLET | Refills: 2 | Status: SHIPPED | OUTPATIENT
Start: 2025-08-07

## 2025-08-07 RX ORDER — ESCITALOPRAM OXALATE 20 MG/1
20 TABLET ORAL DAILY
Qty: 90 TABLET | Refills: 1 | Status: SHIPPED | OUTPATIENT
Start: 2025-08-07

## 2025-08-09 DIAGNOSIS — E11.65 TYPE 2 DIABETES MELLITUS WITH HYPERGLYCEMIA, WITHOUT LONG-TERM CURRENT USE OF INSULIN: ICD-10-CM

## 2025-08-24 ENCOUNTER — RESULTS FOLLOW-UP (OUTPATIENT)
Dept: GASTROENTEROLOGY | Facility: HOSPITAL | Age: 50
End: 2025-08-24
Payer: COMMERCIAL

## 2025-08-24 DIAGNOSIS — K38.9 DISORDER OF APPENDIX: Primary | ICD-10-CM

## (undated) DEVICE — MSK PROC CURAPLEX O2 2/ADAPT 7FT

## (undated) DEVICE — SINGLE-USE POLYPECTOMY SNARE: Brand: CAPTIVATOR II

## (undated) DEVICE — KT ORCA ORCAPOD DISP STRL

## (undated) DEVICE — SINGLE-USE BIOPSY FORCEPS: Brand: RADIAL JAW 4

## (undated) DEVICE — TUBING, SUCTION, 1/4" X 10', STRAIGHT: Brand: MEDLINE

## (undated) DEVICE — ADAPT CLN BIOGUARD AIR/H2O DISP

## (undated) DEVICE — THE SINGLE USE ETRAP – POLYP TRAP IS USED FOR SUCTION RETRIEVAL OF ENDOSCOPICALLY REMOVED POLYPS.: Brand: ETRAP

## (undated) DEVICE — LN SMPL CO2 SHTRM SD STREAM W/M LUER

## (undated) DEVICE — SENSR O2 OXIMAX FNGR A/ 18IN NONSTR